# Patient Record
Sex: FEMALE | Race: WHITE | Employment: FULL TIME | URBAN - METROPOLITAN AREA
[De-identification: names, ages, dates, MRNs, and addresses within clinical notes are randomized per-mention and may not be internally consistent; named-entity substitution may affect disease eponyms.]

---

## 2017-10-04 ENCOUNTER — HOSPITAL ENCOUNTER (OUTPATIENT)
Dept: MRI IMAGING | Facility: CLINIC | Age: 47
Discharge: HOME | End: 2017-10-04
Attending: FAMILY MEDICINE | Admitting: FAMILY MEDICINE
Payer: COMMERCIAL

## 2017-10-04 DIAGNOSIS — N63.10 LUMP OF RIGHT BREAST: ICD-10-CM

## 2017-10-04 DIAGNOSIS — Z00.00 ROUTINE GENERAL MEDICAL EXAMINATION AT A HEALTH CARE FACILITY: ICD-10-CM

## 2017-10-04 PROCEDURE — 25000128 H RX IP 250 OP 636: Performed by: RADIOLOGY

## 2017-10-04 PROCEDURE — A9585 GADOBUTROL INJECTION: HCPCS | Performed by: RADIOLOGY

## 2017-10-04 PROCEDURE — 0159T MR BREAST BILATERAL W/O & W CONTRAST: CPT

## 2017-10-04 RX ORDER — GADOBUTROL 604.72 MG/ML
7.5 INJECTION INTRAVENOUS ONCE
Status: COMPLETED | OUTPATIENT
Start: 2017-10-04 | End: 2017-10-04

## 2017-10-04 RX ADMIN — GADOBUTROL 7.5 ML: 604.72 INJECTION INTRAVENOUS at 11:46

## 2018-04-24 ENCOUNTER — THERAPY VISIT (OUTPATIENT)
Dept: OCCUPATIONAL THERAPY | Facility: CLINIC | Age: 48
End: 2018-04-24
Payer: COMMERCIAL

## 2018-04-24 DIAGNOSIS — M25.521 RIGHT ELBOW PAIN: Primary | ICD-10-CM

## 2018-04-24 PROCEDURE — 97110 THERAPEUTIC EXERCISES: CPT | Mod: GO | Performed by: OCCUPATIONAL THERAPIST

## 2018-04-24 PROCEDURE — 97165 OT EVAL LOW COMPLEX 30 MIN: CPT | Mod: GO | Performed by: OCCUPATIONAL THERAPIST

## 2018-04-24 NOTE — LETTER
[facility]  [address] [address]  [phone]    2018    Re: Lucero Cedeno   :   1970  MRN:  [id number]   REFERRING PHYSICIAN:   Pavithra Knutson    [facility]  Date of Initial Evaluation:  2018  Visits: 1  Rxs Used: 1  Reason for Referral:  Right elbow pain  EVALUATION SUMMARY  Hand Therapy Initial Evaluation  Current Date:  2018  Referring MD: Pavithra Knutson  Return to MD: As needed  Diagnosis:  R Elbow Pain (Medial Epicondylitis)  DOI: 2018  Post:  2 months  Subjective:  Lucero Cedeno is a 47 year old right hand dominant female.  Patient reports symptoms of pain and weakness/loss of strength of the right medial elbow which occurred due to unknown. Patient reports she does a lot of patient transfers and computer work that may have aggravated. Since onset symptoms are Gradually getting better since CSI.  Special tests:  none.  Previous treatment: CSI 18, tennis elbow brace, ice, lidocaine patches (mild help), wrist brace (not helpful), K-tape (mild help). General health as reported by patient is good.  Pertinent medical history includes:migraines/headaches  Medical allergies:Thimerosol.  Surgical history: orthopedic: R Knee Arthroscopy, other: endometrium extraction and umbilical hernia repair.  Medication history: see EMR.  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 - about 24 hours/week but this varies  Job Tasks: prolonged sitting, lifting/carrying, pushing/pulling, computer work  Prior functional level:  no limitations  Barriers include:none  Mobility: No difficulty  Transportation: drives  Leisure activities/hobbies: Workouts - does classes or independent cardio (bike and cardio), walking/running  Other: Has 9 year old, lives with   Upper Extremity Functional Index Score:  SCORE:   Column Totals: /80: 63   (A lower score  indicates greater disability.)  Objective:  Pain Report:  VAS(0-10) 4/24/18   At Rest: 0/10 post CSI  4-5/10 pre CSI   With Use: 2-3/10 post CSI  6-7/10 Post CSI   Location:  R Medial Elbow/Medial Epicondyle  Description:  Tender, sharp with use, constant sore feeling  Frequency:  Intermittent now   Pain is worse: During the day  Pain is exacerbated by:  Patient transfers/lifting, moving groceries/laundry, computer use, wringing out washcloth  Pain is relieved by:  CSI, tendonitis strap, ice, ibuprofen, hydration  Progression since onset:  Resolving since CSI  ROM: Elbow AROM (PROM)  4/24/18 Date: 4/24/18   Left Side: Right   5 Extension 5   140 Flexion 140   90 Supination 90   80 Pronation 80   ROM: Wrist AROM (PROM)  4/24 Date: 4/24   Left Side: Right   75 Extension 67   80 Flexion 75   25 RD 25   40 UD 37    and Pinch Strength - Deferred due to recent CSI  Edema:  [x]        None  []         Mild   []        Moderate  []         Severe     []         of affected part  Scar/Wound:  None  Sensation: [x]         WNL throughout all nerve distributions; per patient report    []         Decreased  Median  Ulnar  Radial  nerve distribution    Resisted Testing: Deferred due to recent CSI  Palpation  Date 4/24/18   Side Right   MEP NT due to injection   Cubital Tunnel + mild   Flexor Wad neg           Special Tests:   Tinels over Cubital Tunnel: neg  Assessment:  Patient presents with symptoms consistent with diagnosis as listed above with conservative intervention.  Patient's limitations or Problem List includes:  Pain, Weakness, Decreased , Tightness in musculature and Adherence in connective tissue of the right elbow which interferes with the patient's ability to perform Self Care Tasks (dressing, eating, bathing, hygiene/toileting), Work Tasks, Recreational Activities and Household Chores as compared to previous level of function.  Rehab Potential:  Excellent - Return to full activity, no limitations  Re:  Lucero Cedeno   :   1970    Patient will benefit from skilled Occupational Therapy to increase  strength and coordination and decrease pain to return to previous activity level and resume normal daily tasks and to reach their rehab potential.  Barriers to Learning:  No barrier  Communication Issues:  Patient appears to be able to clearly communicate and understand verbal and written communication and follow directions correctly.  Chart Review: Detailed history review with patient  Identified Performance Deficits: dressing, hygiene and grooming, health management and maintenance, home establishment and management, work and leisure activities    Assessment of Occupational Performance:  3-5 Performance Deficits  Clinical Decision Making (Complexity): Low complexity  Treatment Explanation:  The following has been discussed with the patient:  RX ordered/plan of care  Anticipated outcomes  Possible risks and side effects  Plan:  Frequency:  1 X every other week, once daily for 2 weeks increasing to 1 x week, once daily   Duration:  for 8 visits    Treatment Plan:  Modalities:  US and Laser Light  Therapeutic Exercise:  AROM, AAROM, PROM, Isotonics and Isometrics  Neuromuscular re-education:  Nerve Gliding and Kinesiotaping  Manual Techniques:  Joint mobilization, Friction massage and Myofascial release  Orthotic Fabrication:  Forearm based orthosis  Self Care:  Self Care Tasks  Home Program:  Avoid underhand lifting, excessive gripping, activities that aggravate pain  OTC Wrist cock up splint for work and tasks that involve lifting/gripping  Tennis elbow strap (over flexors) as prescribed by MD prn  Self massage to FA flexors with ball (avoid tendon until healed from CSI)  Gentle, pain free prolonged FA flexor and extensor stretches (with elbow bent at side)  Next visit:   Patient will follow up in 2 weeks after elbow has recovered from CSI  Laser/US?  FM/MFR - add FM to HEP  FA stretches  Progress to  strengthening when pain decreases  Future visits: strength testing deferred due to recent CSI for initial eval, obtain measures at future visit  Discharge Plan:  Achieve all LTG.  Independent in home treatment program.  Reach maximal therapeutic benefit.                Re: Lucero Fergusonil   :   1970    Thank you for your referral.    INQUIRIES  Therapist: Faby Tracy MS, OTR/L, CHT   [address] [address]  Phone: [phone]  Fax: [phone]

## 2018-04-24 NOTE — PROGRESS NOTES
Hand Therapy Initial Evaluation  Current Date:  4/24/2018    Referring MD: Pavithra Knutson  Return to MD: As needed    Diagnosis:  R Elbow Pain (Medial Epicondylitis)  DOI: March 2018  Post:  2 months    Subjective:  Lucero Cedeno is a 47 year old right hand dominant female.    Patient reports symptoms of pain and weakness/loss of strength of the right medial elbow which occurred due to unknown. Patient reports she does a lot of patient transfers and computer work that may have aggravated. Since onset symptoms are Gradually getting better since CSI.  Special tests:  none.  Previous treatment: CSI 4/19/18, tennis elbow brace, ice, lidocaine patches (mild help), wrist brace (not helpful), K-tape (mild help). General health as reported by patient is good.  Pertinent medical history includes:migraines/headaches  Medical allergies:Thimerosol.  Surgical history: orthopedic: R Knee Arthroscopy, other: endometrium extraction and umbilical hernia repair.  Medication history: see EMR.    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 - about 24 hours/week but this varies  Job Tasks: prolonged sitting, lifting/carrying, pushing/pulling, computer work  Prior functional level:  no limitations  Barriers include:none  Mobility: No difficulty  Transportation: drives  Leisure activities/hobbies: Workouts - does classes or independent cardio (bike and cardio), walking/running  Other: Has 9 year old, lives with     Upper Extremity Functional Index Score:  SCORE:   Column Totals: /80: 63   (A lower score indicates greater disability.)    Objective:    Pain Report:  VAS(0-10) 4/24/18   At Rest: 0/10 post CSI  4-5/10 pre CSI   With Use: 2-3/10 post CSI  6-7/10 Post CSI   Location:  R Medial Elbow/Medial Epicondyle  Description:  Tender, sharp with use, constant sore feeling  Frequency:  Intermittent now   Pain is worse: During the day  Pain is  exacerbated by:  Patient transfers/lifting, moving groceries/laundry, computer use, wringing out washcloth  Pain is relieved by:  CSI, tendonitis strap, ice, ibuprofen, hydration  Progression since onset:  Resolving since CSI    ROM: Elbow AROM (PROM)  4/24/18 Date: 4/24/18   Left Side: Right   5 Extension 5   140 Flexion 140   90 Supination 90   80 Pronation 80     ROM: Wrist AROM (PROM)  4/24 Date: 4/24   Left Side: Right   75 Extension 67   80 Flexion 75   25 RD 25   40 UD 37      and Pinch Strength - Deferred due to recent CSI    Edema:  [x]        None  []         Mild   []        Moderate  []         Severe     []         of affected part    Scar/Wound:  None    Sensation: [x]         WNL throughout all nerve distributions; per patient report    []         Decreased  Median  Ulnar  Radial  nerve distribution    Resisted Testing: Deferred due to recent CSI    Palpation  Date 4/24/18   Side Right   MEP NT due to injection   Cubital Tunnel + mild   Flexor Wad neg             Special Tests:   Tinels over Cubital Tunnel: neg    Assessment:  Patient presents with symptoms consistent with diagnosis as listed above with conservative intervention.    Patient's limitations or Problem List includes:  Pain, Weakness, Decreased , Tightness in musculature and Adherence in connective tissue of the right elbow which interferes with the patient's ability to perform Self Care Tasks (dressing, eating, bathing, hygiene/toileting), Work Tasks, Recreational Activities and Household Chores as compared to previous level of function.    Rehab Potential:  Excellent - Return to full activity, no limitations    Patient will benefit from skilled Occupational Therapy to increase  strength and coordination and decrease pain to return to previous activity level and resume normal daily tasks and to reach their rehab potential.    Barriers to Learning:  No barrier    Communication Issues:  Patient appears to be able to clearly  communicate and understand verbal and written communication and follow directions correctly.    Chart Review: Detailed history review with patient    Identified Performance Deficits: dressing, hygiene and grooming, health management and maintenance, home establishment and management, work and leisure activities    Assessment of Occupational Performance:  3-5 Performance Deficits    Clinical Decision Making (Complexity): Low complexity    Treatment Explanation:  The following has been discussed with the patient:  RX ordered/plan of care  Anticipated outcomes  Possible risks and side effects    Plan:  Frequency:  1 X every other week, once daily for 2 weeks increasing to 1 x week, once daily   Duration:  for 8 visits    Treatment Plan:  Modalities:  US and Laser Light  Therapeutic Exercise:  AROM, AAROM, PROM, Isotonics and Isometrics  Neuromuscular re-education:  Nerve Gliding and Kinesiotaping  Manual Techniques:  Joint mobilization, Friction massage and Myofascial release  Orthotic Fabrication:  Forearm based orthosis  Self Care:  Self Care Tasks    Home Program:  Avoid underhand lifting, excessive gripping, activities that aggravate pain  OTC Wrist cock up splint for work and tasks that involve lifting/gripping  Tennis elbow strap (over flexors) as prescribed by MD prn  Self massage to FA flexors with ball (avoid tendon until healed from CSI)  Gentle, pain free prolonged FA flexor and extensor stretches (with elbow bent at side)    Next visit:   Patient will follow up in 2 weeks after elbow has recovered from CSI  Laser/US?  FM/MFR - add FM to HEP  FA stretches  Progress to strengthening when pain decreases  Future visits: strength testing deferred due to recent CSI for initial eval, obtain measures at future visit    Discharge Plan:  Achieve all LTG.  Independent in home treatment program.  Reach maximal therapeutic benefit.    Please see daily flow sheet for treatment and 1:1 time provided today.

## 2018-05-11 ENCOUNTER — THERAPY VISIT (OUTPATIENT)
Dept: OCCUPATIONAL THERAPY | Facility: CLINIC | Age: 48
End: 2018-05-11
Payer: COMMERCIAL

## 2018-05-11 DIAGNOSIS — M25.521 RIGHT ELBOW PAIN: ICD-10-CM

## 2018-05-11 PROCEDURE — 97140 MANUAL THERAPY 1/> REGIONS: CPT | Mod: GO | Performed by: OCCUPATIONAL THERAPIST

## 2018-05-11 PROCEDURE — 97110 THERAPEUTIC EXERCISES: CPT | Mod: GO | Performed by: OCCUPATIONAL THERAPIST

## 2018-05-11 PROCEDURE — 97035 APP MDLTY 1+ULTRASOUND EA 15: CPT | Mod: GO | Performed by: OCCUPATIONAL THERAPIST

## 2018-05-11 NOTE — PROGRESS NOTES
SOAP note objective information for 5/11/2018.    Please refer to the daily flowsheet for treatment today, total treatment time and time spent performing 1:1 timed codes.       Objective:    Pain Report:  VAS(0-10) 4/24/18 5/11/2018   At Rest: 0/10 post CSI  4-5/10 pre CSI R: 0/10   With Use: 2-3/10 post CSI  6-7/10 Post CSI R: 1-2     Location:  R Medial Elbow/Medial Epicondyle  Description:  Tender, sharp with use, constant sore feeling  Frequency:  Intermittent now   Pain is worse: During the day  Pain is exacerbated by:  Patient transfers/lifting, moving groceries/laundry, computer use, wringing out washcloth  Pain is relieved by:  CSI, tendonitis strap, ice, ibuprofen, hydration  Progression since onset:  Resolving since CSI    ROM: Elbow AROM (PROM)  4/24/18 Date: 4/24/18   Left Side: Right   5 Extension 5   140 Flexion 140   90 Supination 90   80 Pronation 80     ROM: Wrist AROM (PROM)  4/24 Date: 4/24   Left Side: Right   75 Extension 67   80 Flexion 75   25 RD 25   40 UD 37      and Pinch Strength - Deferred due to recent CSI    Edema:  [x]        None  []         Mild   []        Moderate  []         Severe     []         of affected part    Scar/Wound:  None    Sensation: [x]         WNL throughout all nerve distributions; per patient report    []         Decreased  Median  Ulnar  Radial  nerve distribution    Resisted Testing: Deferred due to recent CSI    Palpation  Date 4/24/18 5/11/2018   Side Right Right   MEP NT due to injection Minimal   Cubital Tunnel + mild -   Flexor Wad neg ++               Special Tests:   Tinels over Cubital Tunnel: neg    Home Program:  Avoid underhand lifting, excessive gripping, activities that aggravate pain  OTC Wrist cock up splint for work and tasks that involve lifting/gripping  Tennis elbow strap (over flexors) as prescribed by MD prn  Self massage to FA flexors with ball   Gentle, pain free prolonged FA flexor and extensor stretches  FM to MEP    Next visit:    US  FM/MFR - add FM to HEP  FA stretches  Progress to strengthening when pain decreases  Future visits: strength testing

## 2018-05-11 NOTE — MR AVS SNAPSHOT
"              After Visit Summary   5/11/2018    Lucero Cedeno    MRN: [id number]           Patient Information     Date Of Birth          1970        Visit Information        Provider Department      5/11/2018 11:00 AM Danette Kunz [facility]        Today's Diagnoses     Right elbow pain           Follow-ups after your visit        Your next 10 appointments already scheduled     May 17, 2018 11:00 AM CDT   [facility] Hand with Danette Ze   [facility]    [address] [address] [phone]            May 24, 2018  1:00 PM CDT   CK Hand with Claudia Haney, OT   [facility] ([facility])    [address] [address] [phone]            May 31, 2018 11:00 AM CDT   CK Hand with Danette Kunz   [facility]    [address] [id number] [phone]              Who to contact     If you have questions or need follow up information about today's clinic visit or your schedule please contact [facility] directly at [phone].  Normal or non-critical lab and imaging results will be communicated to you by [employer], letter or phone within 4 business days after the clinic has received the results. If you do not hear from us within 7 days, please contact the clinic through [employer] or phone. If you have a critical or abnormal lab result, we will notify you by phone as soon as possible.  Submit refill requests through [employer] or call your pharmacy and they will forward the refill request to us. Please allow 3 business days for your refill to be completed.          Additional Information About Your Visit        [employer] Information     [employer] lets you send messages to your doctor, view your test results, renew your prescriptions, schedule appointments and more. To sign up, go to [email or web address] . Click on \"Log in\" on the left " "side of the screen, which will take you to the Welcome page. Then click on \"Sign up Now\" on the right side of the page.     You will be asked to enter the access code listed below, as well as some personal information. Please follow the directions to create your username and password.     Your access code is: [email or web address]  Expires: 2018 11:55 AM     Your access code will  in 90 days. If you need help or a new code, please call your [facility] or [phone].        Care EveryWhere ID     This is your Care EveryWhere ID. This could be used by other organizations to access your [place] medical records  [id number]         Blood Pressure from Last 3 Encounters:   11 115/75    Weight from Last 3 Encounters:   11 71.7 kg (158 lb)              We Performed the Following     MANUAL THER TECH,1+REGIONS,EA 15 MIN     THERAPEUTIC EXERCISES     ULTRASOUND THERAPY        Primary Care Provider Office Phone # Fax #    Snehal Solitario [phone]       [address] [place] [address] [place] [address]        Equal Access to Services     TEJAS MUHAMMAD : Hadii aad ku hadasho Soomaali, waaxda luqadaha, qaybta kaalmada adejeanyaandres, alfredo montez . So [facility] [phone].    ATENCIÓN: Si habla español, tiene a ramesh disposición servicios gratuitos de asistencia lingüística. [facility] [phone].    We comply with applicable federal civil rights laws and [place] laws. We do not discriminate on the basis of race, color, national origin, age, disability, sex, sexual orientation, or gender identity.            Thank you!     Thank you for choosing [facility]  for your care. Our goal is always to provide you with excellent care. Hearing back from our patients is one way we can continue to improve our services. Please take a few minutes to complete the written survey that you may receive in the mail after your visit with us. Thank you!             Your " Updated Medication List - Protect others around you: Learn how to safely use, store and throw away your medicines at www.disposemymeds.org.          This list is accurate as of 5/11/18 12:03 PM.  Always use your most recent med list.                   Brand Name Dispense Instructions for use Diagnosis    CALCIUM + D PO      Take  by mouth.        FISH OIL PO      Take  by mouth.        MULTIVITAMIN PO      Take  by mouth.        spironolactone 25 MG tablet    ALDACTONE     Take 25 mg by mouth daily.        venlafaxine 50 MG tablet    EFFEXOR     Take 50 mg by mouth 3 times daily.        vitamin D 400 units capsule      Take 1 capsule by mouth daily.

## 2018-05-17 ENCOUNTER — THERAPY VISIT (OUTPATIENT)
Dept: OCCUPATIONAL THERAPY | Facility: CLINIC | Age: 48
End: 2018-05-17
Payer: COMMERCIAL

## 2018-05-17 DIAGNOSIS — M25.521 RIGHT ELBOW PAIN: ICD-10-CM

## 2018-05-17 PROCEDURE — 97140 MANUAL THERAPY 1/> REGIONS: CPT | Mod: GO | Performed by: OCCUPATIONAL THERAPIST

## 2018-05-17 PROCEDURE — 97110 THERAPEUTIC EXERCISES: CPT | Mod: GO | Performed by: OCCUPATIONAL THERAPIST

## 2018-05-17 PROCEDURE — 97035 APP MDLTY 1+ULTRASOUND EA 15: CPT | Mod: GO | Performed by: OCCUPATIONAL THERAPIST

## 2018-05-17 NOTE — PROGRESS NOTES
SOAP note objective information for 5/17/2018.    Please refer to the daily flowsheet for treatment today, total treatment time and time spent performing 1:1 timed codes.       Objective:    Pain Report:  VAS(0-10) 4/24/18 5/11/18 5/17/18   At Rest: 0/10 post CSI  4-5/10 pre CSI R: 0/10 R: 0/10   With Use: 2-3/10 post CSI  6-7/10 Post CSI R: 1-2 R: 1-2     Location:  R Medial Elbow/Medial Epicondyle  Description:  Tender, sharp with use, constant sore feeling  Frequency:  Intermittent now   Pain is worse: During the day  Pain is exacerbated by:  Patient transfers/lifting, moving groceries/laundry, computer use, wringing out washcloth  Pain is relieved by:  CSI, tendonitis strap, ice, ibuprofen, hydration  Progression since onset:  Resolving since CSI    ROM: Elbow AROM (PROM)  4/24/18 Date: 4/24/18   Left Side: Right   5 Extension 5   140 Flexion 140   90 Supination 90   80 Pronation 80     ROM: Wrist AROM (PROM)  4/24 Date: 4/24 5/18   Left Side: Right Right   75 Extension 67 75   80 Flexion 75 80   25 RD 25    40 UD 37       and Pinch Strength - Deferred due to recent CSI    Edema:  [x]        None  []         Mild   []        Moderate  []         Severe     []         of affected part    Scar/Wound:  None    Sensation: [x]         WNL throughout all nerve distributions; per patient report    []         Decreased  Median  Ulnar  Radial  nerve distribution    Resisted Testing: Deferred due to recent CSI    Palpation  Date 4/24/18 5/11/18 5/17/18   Side Right Right Right   MEP NT due to injection Minimal    Cubital Tunnel + mild -    Flexor Wad neg ++ +                 Resisted Testing 5/17/2018       FCU -       Pronator -       Finger flexors -                             Special Tests:   Tinels over Cubital Tunnel: neg    Home Program:  Avoid underhand lifting, excessive gripping, activities that aggravate pain  OTC Wrist cock up splint for work and tasks that involve lifting/gripping  Tennis elbow strap  (over flexors) as prescribed by MD prn  Self massage to FA flexors with ball   Gentle, pain free prolonged FA flexor and extensor stretches  FM to MEP  Eccentric wrist strengthening    Next visit:   US  [facility]  Future visits: strength testing

## 2018-05-17 NOTE — MR AVS SNAPSHOT
"              After Visit Summary   5/17/2018    Lucero Cedeno    MRN: [id number]           Patient Information     Date Of Birth          1970        Visit Information        Provider Department      5/17/2018 11:00 AM Danette Kunz [facility]        Today's Diagnoses     Right elbow pain           Follow-ups after your visit        Your next 10 appointments already scheduled     May 24, 2018  1:00 PM CDT   CK Hand with Claudia Haney OT   [facility] ([facility])    [address] [place] [address] [phone]              Who to contact     If you have questions or need follow up information about today's clinic visit or your schedule please contact [facility] directly at [phone].  Normal or non-critical lab and imaging results will be communicated to you by MyChart, letter or phone within 4 business days after the clinic has received the results. If you do not hear from us within 7 days, please contact the clinic through MyChart or phone. If you have a critical or abnormal lab result, we will notify you by phone as soon as possible.  Submit refill requests through ExpertFlyer or call your pharmacy and they will forward the refill request to us. Please allow 3 business days for your refill to be completed.          Additional Information About Your Visit        DealCurioushart Information     ExpertFlyer lets you send messages to your doctor, view your test results, renew your prescriptions, schedule appointments and more. To sign up, go to [email or web address] . Click on \"Log in\" on the left side of the screen, which will take you to the Welcome page. Then click on \"Sign up Now\" on the right side of the page.     You will be asked to enter the access code listed below, as well as some personal information. Please follow the directions to create your username and password.     Your access code is: [email or web address]  Expires: 8/8/2018 11:55 AM     Your access " code will  in 90 days. If you need help or a new code, please call your [place] clinic or [phone].        Care EveryWhere ID     This is your Care EveryWhere ID. This could be used by other organizations to access your [place] medical records  [id number]         Blood Pressure from Last 3 Encounters:   11 115/75    Weight from Last 3 Encounters:   11 71.7 kg (158 lb)              We Performed the Following     MANUAL THER TECH,1+REGIONS,EA 15 MIN     THERAPEUTIC EXERCISES     ULTRASOUND THERAPY        Primary Care Provider Office Phone # Fax #    Snehal Solitario [phone]       [address] [place] [place] [address]        Equal Access to Services     TEJAS MUHAMMAD : Hadii aad ku hadasho Soomaali, waaxda luqadaha, qaybta kaalmada adeegyada, waxay idiin hayalessandron madan montez . So [facility] [phone].    ATENCIÓN: Si habla español, tiene a ramesh disposición servicios gratuitos de asistencia lingüística. [facility] [phone].    We comply with applicable federal civil rights laws and [place] laws. We do not discriminate on the basis of race, color, national origin, age, disability, sex, sexual orientation, or gender identity.            Thank you!     Thank you for choosing [facility]  for your care. Our goal is always to provide you with excellent care. Hearing back from our patients is one way we can continue to improve our services. Please take a few minutes to complete the written survey that you may receive in the mail after your visit with us. Thank you!             Your Updated Medication List - Protect others around you: Learn how to safely use, store and throw away your medicines at www.disposemymeds.org.          This list is accurate as of 18 11:50 AM.  Always use your most recent med list.                   Brand Name Dispense Instructions for use Diagnosis    CALCIUM + D PO      Take  by mouth.        FISH OIL PO       Take  by mouth.        MULTIVITAMIN PO      Take  by mouth.        spironolactone 25 MG tablet    ALDACTONE     Take 25 mg by mouth daily.        venlafaxine 50 MG tablet    EFFEXOR     Take 50 mg by mouth 3 times daily.        vitamin D 400 units capsule      Take 1 capsule by mouth daily.

## 2018-05-24 ENCOUNTER — THERAPY VISIT (OUTPATIENT)
Dept: OCCUPATIONAL THERAPY | Facility: CLINIC | Age: 48
End: 2018-05-24
Payer: COMMERCIAL

## 2018-05-24 DIAGNOSIS — M25.521 RIGHT ELBOW PAIN: ICD-10-CM

## 2018-05-24 PROCEDURE — 97140 MANUAL THERAPY 1/> REGIONS: CPT | Mod: GO | Performed by: OCCUPATIONAL THERAPIST

## 2018-05-24 PROCEDURE — 97035 APP MDLTY 1+ULTRASOUND EA 15: CPT | Mod: GO | Performed by: OCCUPATIONAL THERAPIST

## 2018-05-24 PROCEDURE — 97110 THERAPEUTIC EXERCISES: CPT | Mod: GO | Performed by: OCCUPATIONAL THERAPIST

## 2018-05-24 NOTE — PROGRESS NOTES
"SOAP note objective information for 5/24/2018.    Please refer to the daily flowsheet for treatment today, total treatment time and time spent performing 1:1 timed codes.     S; \"the only thing I have noticed was cutting a block of cheese I noticed just a little twinge\"  Objective:    Pain Report:  VAS(0-10) 4/24/18 5/11/18 5/17/18 5/24   At Rest: 0/10 post CSI  4-5/10 pre CSI R: 0/10 R: 0/10 R:0/10   With Use: 2-3/10 post CSI  6-7/10 Post CSI R: 1-2 R: 1-2 R:0-1     Location:  R Medial Elbow/Medial Epicondyle  Description:  Twinge  Frequency:  infrequent   Pain is worse: During the day  Pain is exacerbated by:  Patient trans  fers/lifting, moving groceries/laundry, computer use, wringing out washcloth  Pain is relieved by:  CSI, tendonitis strap, ice, ibuprofen, hydration  Progression since onset:  Resolving since CSI    ROM: Elbow AROM (PROM)  4/24/18 Date: 4/24/18   Left Side: Right   5 Extension 5   140 Flexion 140   90 Supination 90   80 Pronation 80     ROM: Wrist AROM (PROM)  4/24 Date: 4/24 5/18   Left Side: Right Right   75 Extension 67 75   80 Flexion 75 80   25 RD 25    40 UD 37       and Pinch Strength - Deferred due to recent CSI    Edema:  [x]        None  []         Mild   []        Moderate  []         Severe     []         of affected part    Scar/Wound:  None    Sensation: [x]         WNL throughout all nerve distributions; per patient report    []         Decreased  Median  Ulnar  Radial  nerve distribution    Resisted Testing: Deferred due to recent CSI    Palpation  Date 4/24/18 5/11/18 5/17/18 5/24   Side Right Right Right    MEP NT due to injection Minimal     Cubital Tunnel + mild -     Flexor Wad neg ++ +                    Resisted Testing 5/17/2018       FCU -       Pronator -       Finger flexors -                             Special Tests:   Tinels over Cubital Tunnel: neg    Home Program:  Avoid underhand lifting, excessive gripping, activities that aggravate pain  OTC Wrist cock up " splint for work and tasks that involve lifting/gripping  Tennis elbow strap (over flexors) as prescribed by MD prn  Self massage to FA flexors with ball   Gentle, pain free prolonged FA flexor and extensor stretches  FM to MEP  Eccentric wrist strengthening  Can wean into more functional tasks being mindful of pain    Next visit:   [facility]  Future visits: strength testing

## 2018-05-24 NOTE — MR AVS SNAPSHOT
"              After Visit Summary   2018    Lucero Cedeno    MRN: [id number]           Patient Information     Date Of Birth          1970        Visit Information        Provider Department      2018 1:00 PM Claudia Haney OT [facility]        Today's Diagnoses     Right elbow pain           Follow-ups after your visit        Who to contact     If you have questions or need follow up information about today's clinic visit or your schedule please contact [facility] directly at [phone].  Normal or non-critical lab and imaging results will be communicated to you by MyChart, letter or phone within 4 business days after the clinic has received the results. If you do not hear from us within 7 days, please contact the clinic through iHearthart or phone. If you have a critical or abnormal lab result, we will notify you by phone as soon as possible.  Submit refill requests through [employer] or call your pharmacy and they will forward the refill request to us. Please allow 3 business days for your refill to be completed.          Additional Information About Your Visit        MyChart Information     [employer] lets you send messages to your doctor, view your test results, renew your prescriptions, schedule appointments and more. To sign up, go to [email or web address] . Click on \"Log in\" on the left side of the screen, which will take you to the Welcome page. Then click on \"Sign up Now\" on the right side of the page.     You will be asked to enter the access code listed below, as well as some personal information. Please follow the directions to create your username and password.     Your access code is: [email or web address]  Expires: 2018 11:55 AM     Your access code will  in 90 days. If you need help or a new code, please call your [place] clinic or [phone].        Care EveryWhere ID     This is your Care EveryWhere ID. This could be used by other organizations to access your " [place] medical records  [id number]         Blood Pressure from Last 3 Encounters:   06/21/11 115/75    Weight from Last 3 Encounters:   06/21/11 71.7 kg (158 lb)              We Performed the Following     MANUAL THER TECH,1+REGIONS,EA 15 MIN     THERAPEUTIC EXERCISES     ULTRASOUND THERAPY        Primary Care Provider Office Phone # Fax #    Snehal Solitario [phone]       [address] [place] [address] [place] [address]        Equal Access to Services     TEJAS MUHAMMAD : Hadii aad ku hadasho Soomaali, waaxda luqadaha, qaybta kaalmada adeegyada, waxay idiin hayaan adeeg kharash la'bertrand . So [facility] [phone].    ATENCIÓN: Si habla español, tiene a ramesh disposición servicios gratuitos de asistencia lingüística. [facility] [phone].    We comply with applicable federal civil rights laws and [place] laws. We do not discriminate on the basis of race, color, national origin, age, disability, sex, sexual orientation, or gender identity.            Thank you!     Thank you for choosing [facility]  for your care. Our goal is always to provide you with excellent care. Hearing back from our patients is one way we can continue to improve our services. Please take a few minutes to complete the written survey that you may receive in the mail after your visit with us. Thank you!             Your Updated Medication List - Protect others around you: Learn how to safely use, store and throw away your medicines at www.disposemymeds.org.          This list is accurate as of 5/24/18  1:59 PM.  Always use your most recent med list.                   Brand Name Dispense Instructions for use Diagnosis    CALCIUM + D PO      Take  by mouth.        FISH OIL PO      Take  by mouth.        MULTIVITAMIN PO      Take  by mouth.        spironolactone 25 MG tablet    ALDACTONE     Take 25 mg by mouth daily.        venlafaxine 50 MG tablet    EFFEXOR     Take 50 mg by mouth 3 times daily.         vitamin D 400 units capsule      Take 1 capsule by mouth daily.

## 2018-06-28 ENCOUNTER — THERAPY VISIT (OUTPATIENT)
Dept: OCCUPATIONAL THERAPY | Facility: CLINIC | Age: 48
End: 2018-06-28
Payer: COMMERCIAL

## 2018-06-28 DIAGNOSIS — M25.521 RIGHT ELBOW PAIN: ICD-10-CM

## 2018-06-28 PROCEDURE — 97110 THERAPEUTIC EXERCISES: CPT | Mod: GO | Performed by: OCCUPATIONAL THERAPIST

## 2018-06-28 PROCEDURE — 97035 APP MDLTY 1+ULTRASOUND EA 15: CPT | Mod: GO | Performed by: OCCUPATIONAL THERAPIST

## 2018-06-28 PROCEDURE — 97140 MANUAL THERAPY 1/> REGIONS: CPT | Mod: GO | Performed by: OCCUPATIONAL THERAPIST

## 2018-06-28 NOTE — MR AVS SNAPSHOT
"              After Visit Summary   6/28/2018    Lucero Cedeno    MRN: [id number]           Patient Information     Date Of Birth          1970        Visit Information        Provider Department      6/28/2018 8:00 AM Danette Kunz [facility]        Today's Diagnoses     Right elbow pain           Follow-ups after your visit        Your next 10 appointments already scheduled     Jul 02, 2018  9:00 AM CDT   CK Hand with Dayanna Singletary OT   [facility] ([facility])    [address] [facility] [facility]  [address] [facility] [id number] [phone]            Jul 10, 2018 10:00 AM CDT   CK Hand with Danette Ze   [facility] ([facility]    [address] [place] [address] [phone]              Who to contact     If you have questions or need follow up information about today's clinic visit or your schedule please contact [facility] directly at [phone].  Normal or non-critical lab and imaging results will be communicated to you by Authentic8hart, letter or phone within 4 business days after the clinic has received the results. If you do not hear from us within 7 days, please contact the clinic through [employer] Education[employer] or phone. If you have a critical or abnormal lab result, we will notify you by phone as soon as possible.  Submit refill requests through LensAR or call your pharmacy and they will forward the refill request to us. Please allow 3 business days for your refill to be completed.          Additional Information About Your Visit        Authentic8harBering Media Information     LensAR lets you send messages to your doctor, view your test results, renew your prescriptions, schedule appointments and more. To sign up, go to [email or web address] . Click on \"Log in\" on the left side of the screen, which will take you to the Welcome page. Then click on \"Sign up Now\" on the right side of the page.     You will be asked to enter the " access code listed below, as well as some personal information. Please follow the directions to create your username and password.     Your access code is: [email or web address]  Expires: 2018 11:55 AM     Your access code will  in 90 days. If you need help or a new code, please call your [facility] or [phone].        Care EveryWhere ID     This is your Care EveryWhere ID. This could be used by other organizations to access your [place] medical records  [id number]         Blood Pressure from Last 3 Encounters:   11 115/75    Weight from Last 3 Encounters:   11 71.7 kg (158 lb)              We Performed the Following     CK PROGRESS NOTES REPORT     MANUAL THER TECH,1+REGIONS,EA 15 MIN     THERAPEUTIC EXERCISES     ULTRASOUND THERAPY        Primary Care Provider Office Phone # Fax #    Snehal Solitario [phone]       [address] [place] [place] [address]        Equal Access to Services     TEJAS MUHAMMAD : Hadii aad ku hadasho Soomaali, waaxda luqadaha, qaybta kaalmada adeegyada, waxay idiin hayaan madan montez . So [facility] [phone].    ATENCIÓN: Si habla español, tiene a ramesh disposición servicios gratuitos de asistencia lingüística. Llame al [phone].    We comply with applicable federal civil rights laws and [place] laws. We do not discriminate on the basis of race, color, national origin, age, disability, sex, sexual orientation, or gender identity.            Thank you!     Thank you for choosing [facility]  for your care. Our goal is always to provide you with excellent care. Hearing back from our patients is one way we can continue to improve our services. Please take a few minutes to complete the written survey that you may receive in the mail after your visit with us. Thank you!             Your Updated Medication List - Protect others around you: Learn how to safely use, store and throw away your medicines at  www.disposemymeds.org.          This list is accurate as of 6/28/18 11:56 AM.  Always use your most recent med list.                   Brand Name Dispense Instructions for use Diagnosis    CALCIUM + D PO      Take  by mouth.        FISH OIL PO      Take  by mouth.        MULTIVITAMIN PO      Take  by mouth.        spironolactone 25 MG tablet    ALDACTONE     Take 25 mg by mouth daily.        venlafaxine 50 MG tablet    EFFEXOR     Take 50 mg by mouth 3 times daily.        vitamin D 400 units capsule      Take 1 capsule by mouth daily.

## 2018-06-28 NOTE — PROGRESS NOTES
Progress Note - Hand Therapy    Current Date:  6/28/2018    Diagnosis:  R Elbow Pain (Medial Epicondylitis)  DOI: March 2018  Post:  3 months    Number of visits to date:  4    Reporting period is 4/24/2018 to 6/28/2018.    Subjectve:   Subjective changes as noted by patient:  Overdid using arm in the garden.  It really hurts now.   Functional changes noted by patient:  Decreased Performance in Self Care Tasks (dressing, eating, bathing), Work Tasks and Household Chores  Patient has noted adverse reaction to:  None        Objective:  Pain Report:  VAS(0-10) 4/24/18 5/11/18 5/17/18 5/24 6/28/18   At Rest: 0/10 post CSI  4-5/10 pre CSI R: 0/10 R: 0/10 R:0/10 R: 4-5/10   With Use: 2-3/10 post CSI  6-7/10 Post CSI R: 1-2 R: 1-2 R:0-1 R: 6-7/10     Location:  R Medial Elbow/Medial Epicondyle  Description:  Twinge  Frequency:  infrequent   Pain is worse: During the day  Pain is exacerbated by:  Patient trans  fers/lifting, moving groceries/laundry, computer use, wringing out washcloth  Pain is relieved by:  CSI, tendonitis strap, ice, ibuprofen, hydration  Progression since onset: exacerbation    ROM: Elbow AROM (PROM)  4/24/18 Date: 4/24/18   Left Side: Right   5 Extension 5   140 Flexion 140   90 Supination 90   80 Pronation 80     ROM: Wrist AROM (PROM)  4/24 Date: 4/24 5/18   Left Side: Right Right   75 Extension 67 75   80 Flexion 75 80   25 RD 25    40 UD 37       and Pinch Strength - Deferred due to recent CSI    Edema:  [x]        None  []         Mild   []        Moderate  []         Severe     []         of affected part    Scar/Wound:  None    Sensation: [x]         WNL throughout all nerve distributions; per patient report    []         Decreased  Median  Ulnar  Radial  nerve distribution    Resisted Testing: Deferred due to recent CSI    Palpation  Date 4/24/18 5/11/18 5/17/18 6/28/18   Side Right Right Right Right   MEP NT due to injection Minimal  ++   Cubital Tunnel + mild -  -   Flexor Wad neg ++ + ++                    Resisted Testing 5/17/2018       FCU -       Pronator -       Finger flexors -                           Special Tests:   Tinels over Cubital Tunnel: neg    Assessment:  Response to therapy has been decline in:  Flexibility:  increased tightness in involved muscles  Pain:  intensity of pain has increased and duration of pain is increased    Overall Assessment:  Patient has experienced an exacerbation of symptoms.  STG/LTG:  STGoals have been reviewed and no progress has been made;  see goal sheet for details and changes.    Plan:  Frequency/Duration:  Recommend continuing to see patient  1 X week, once daily  for 4-6 visits  Appropriateness of Rx I have re-evaluated this patient and find that the nature, scope, duration and intensity of the therapy is appropriate for the medical condition of the patient.    Recommendations for Continued Therapy  Additions to Treatment Plan -  Spiky ball    Home Program:  Avoid underhand lifting, excessive gripping, activities that aggravate pain  OTC Wrist cock up splint for work and tasks that involve lifting/gripping  Tennis elbow strap (over flexors) as prescribed by MD prn  Self massage to FA flexors with ball   Gentle, pain free prolonged FA flexor and extensor stretches  FM to MEP  Eccentric wrist strengthening  Can wean into more functional tasks being mindful of pain  Spiky ball    Next visit:   [facility]  Future visits: strength testing

## 2018-07-10 ENCOUNTER — THERAPY VISIT (OUTPATIENT)
Dept: OCCUPATIONAL THERAPY | Facility: CLINIC | Age: 48
End: 2018-07-10
Payer: COMMERCIAL

## 2018-07-10 DIAGNOSIS — M25.521 RIGHT ELBOW PAIN: ICD-10-CM

## 2018-07-10 PROCEDURE — 97035 APP MDLTY 1+ULTRASOUND EA 15: CPT | Mod: GO | Performed by: OCCUPATIONAL THERAPIST

## 2018-07-10 PROCEDURE — 97760 ORTHOTIC MGMT&TRAING 1ST ENC: CPT | Mod: GO | Performed by: OCCUPATIONAL THERAPIST

## 2018-07-10 PROCEDURE — 97140 MANUAL THERAPY 1/> REGIONS: CPT | Mod: GO | Performed by: OCCUPATIONAL THERAPIST

## 2018-07-10 NOTE — MR AVS SNAPSHOT
"              After Visit Summary   7/10/2018    Lucero Cedeno    MRN: [id number]           Patient Information     Date Of Birth          1970        Visit Information        Provider Department      7/10/2018 10:00 AM Danette Kunz [facility]        Today's Diagnoses     Right elbow pain           Follow-ups after your visit        Your next 10 appointments already scheduled     Jul 19, 2018  9:30 AM CDT   CK Hand with Dayanna Singletary OT   [facility] ([facility])    [address] [place] [address] [phone]              Who to contact     If you have questions or need follow up information about today's clinic visit or your schedule please contact [facility] directly at [phone].  Normal or non-critical lab and imaging results will be communicated to you by MyChart, letter or phone within 4 business days after the clinic has received the results. If you do not hear from us within 7 days, please contact the clinic through MyChart or phone. If you have a critical or abnormal lab result, we will notify you by phone as soon as possible.  Submit refill requests through [employer] or call your pharmacy and they will forward the refill request to us. Please allow 3 business days for your refill to be completed.          Additional Information About Your Visit        MyChart Information     [employer] lets you send messages to your doctor, view your test results, renew your prescriptions, schedule appointments and more. To sign up, go to [email or web address] . Click on \"Log in\" on the left side of the screen, which will take you to the Welcome page. Then click on \"Sign up Now\" on the right side of the page.     You will be asked to enter the access code listed below, as well as some personal information. Please follow the directions to create your username and password.     Your access code is: [email or web address]  Expires: 8/8/2018 " 11:55 AM     Your access code will  in 90 days. If you need help or a new code, please call your [facility] or [phone].        Care EveryWhere ID     This is your Care EveryWhere ID. This could be used by other organizations to access your [place] medical records  [id number]         Blood Pressure from Last 3 Encounters:   11 115/75    Weight from Last 3 Encounters:   11 71.7 kg (158 lb)              We Performed the Following     MANUAL THER TECH,1+REGIONS,EA 15 MIN     ORTHOTIC MGMT AND TRAINING, EACH 15 MIN     ULTRASOUND THERAPY        Primary Care Provider Office Phone # Fax #    Snehal Solitario [phone]       [address]  [facility] [address]        Equal Access to Services     TEJAS MUHAMMAD : Hadii susan wood hadasho Soomaali, waaxda luqadaha, qaybta kaalmada adeegyada, alfredo montez . So [facility] [phone].    ATENCIÓN: Si habla español, tiene a ramesh disposición servicios gratuitos de asistencia lingüística. Llame al [phone].    We comply with applicable federal civil rights laws and [place] laws. We do not discriminate on the basis of race, color, national origin, age, disability, sex, sexual orientation, or gender identity.            Thank you!     Thank you for choosing [facility]  for your care. Our goal is always to provide you with excellent care. Hearing back from our patients is one way we can continue to improve our services. Please take a few minutes to complete the written survey that you may receive in the mail after your visit with us. Thank you!             Your Updated Medication List - Protect others around you: Learn how to safely use, store and throw away your medicines at www.disposemymeds.org.          This list is accurate as of 7/10/18 12:59 PM.  Always use your most recent med list.                   Brand Name Dispense Instructions for use Diagnosis    CALCIUM + D PO       Take  by mouth.        FISH OIL PO      Take  by mouth.        MULTIVITAMIN PO      Take  by mouth.        spironolactone 25 MG tablet    ALDACTONE     Take 25 mg by mouth daily.        venlafaxine 50 MG tablet    EFFEXOR     Take 50 mg by mouth 3 times daily.        vitamin D 400 units capsule      Take 1 capsule by mouth daily.

## 2018-07-10 NOTE — PROGRESS NOTES
SOAP note objective information for 7/10/2018.    Please refer to the daily flowsheet for treatment today, total treatment time and time spent performing 1:1 timed codes.         Objective:  Pain Report:  VAS(0-10) 4/24/18 5/11/18 5/17/18 5/24 6/28/18 7/10/18   At Rest: 0/10 post CSI  4-5/10 pre CSI R: 0/10 R: 0/10 R:0/10 R: 4-5/10 R: 0/10   With Use: 2-3/10 post CSI  6-7/10 Post CSI R: 1-2 R: 1-2 R:0-1 R: 6-7/10 R: 6-7/10     Location:  R Medial Elbow/Medial Epicondyle  Description:  [place]  Frequency:  infrequent   Pain is worse: During the day  Pain is exacerbated by:  Patient trans  fers/lifting, moving groceries/laundry, computer use, wringing out washcloth  Pain is relieved by:  CSI, tendonitis strap, ice, ibuprofen, hydration  Progression since onset: improving    ROM: Elbow AROM (PROM)  4/24/18 Date: 4/24/18   Left Side: Right   5 Extension 5   140 Flexion 140   90 Supination 90   80 Pronation 80     ROM: Wrist AROM (PROM)  4/24 Date: 4/24 5/18   Left Side: Right Right   75 Extension 67 75   80 Flexion 75 80   25 RD 25    40 UD 37       and Pinch Strength - Deferred due to recent CSI    Edema:  [x]        None  []         Mild   []        Moderate  []         Severe     []         of affected part    Scar/Wound:  None    Sensation: [x]         WNL throughout all nerve distributions; per patient report    []         Decreased  Median  Ulnar  Radial  nerve distribution    Resisted Testing: Deferred due to recent CSI    Palpation  Date 4/24/18 5/11/18 5/17/18 6/28/18 7/10/18   Side Right Right Right Right Right   MEP NT due to injection Minimal  ++ ++ to +++   Cubital Tunnel + mild -  -    Flexor Wad neg ++ + ++ ++                     Resisted Testing 5/17/2018       FCU -       Pronator -       Finger flexors -                           Special Tests:   Tinels over Cubital Tunnel: neg      Home Program:  Avoid underhand lifting, excessive gripping, activities that aggravate pain  OTC Wrist cock up splint  for work and tasks that involve lifting/gripping  Tennis elbow strap (over flexors) as prescribed by MD prn  Self massage to FA flexors with ball   Gentle, pain free prolonged FA flexor and extensor stretches  FM to MEP  Eccentric wrist strengthening  Can wean into more functional tasks being mindful of pain  Spiky ball  Zipper orthosis to prevent full pro/sup    Next visit:   Pt. To speak to MD regarding iontophoresis  US  STM  Future visits: strength testing

## 2018-07-19 ENCOUNTER — THERAPY VISIT (OUTPATIENT)
Dept: OCCUPATIONAL THERAPY | Facility: CLINIC | Age: 48
End: 2018-07-19
Payer: COMMERCIAL

## 2018-07-19 DIAGNOSIS — M25.521 RIGHT ELBOW PAIN: ICD-10-CM

## 2018-07-19 PROCEDURE — 97140 MANUAL THERAPY 1/> REGIONS: CPT | Mod: GO | Performed by: OCCUPATIONAL THERAPIST

## 2018-07-19 PROCEDURE — 97110 THERAPEUTIC EXERCISES: CPT | Mod: GO | Performed by: OCCUPATIONAL THERAPIST

## 2018-08-21 ENCOUNTER — THERAPY VISIT (OUTPATIENT)
Dept: OCCUPATIONAL THERAPY | Facility: CLINIC | Age: 48
End: 2018-08-21
Payer: COMMERCIAL

## 2018-08-21 DIAGNOSIS — M25.521 RIGHT ELBOW PAIN: ICD-10-CM

## 2018-08-21 PROCEDURE — 97035 APP MDLTY 1+ULTRASOUND EA 15: CPT | Mod: GO | Performed by: OCCUPATIONAL THERAPIST

## 2018-08-21 PROCEDURE — 97140 MANUAL THERAPY 1/> REGIONS: CPT | Mod: GO | Performed by: OCCUPATIONAL THERAPIST

## 2018-08-21 PROCEDURE — 97110 THERAPEUTIC EXERCISES: CPT | Mod: GO | Performed by: OCCUPATIONAL THERAPIST

## 2018-08-21 NOTE — LETTER
[facility]  [address] [address]  [phone]    2018  Re: Lucero Cedeno   :   1970  MRN:  [id number]   REFERRING PHYSICIAN:   Pavithra Knutson    [facility]  Date of Initial Evaluation: 2018  Visits:  Rxs Used: 8  Reason for Referral:  Right elbow pain    EVALUATION SUMMARY  Progress Note - Hand Therapy  Current Date:  2018    Diagnosis:  R Elbow Pain (Medial Epicondylitis)  DOI: 2018  Post:  5 months  Number of visits to date:  7  Reporting period is 2018 through 2018    Upper Extremity Functional Index Score:  SCORE:   Column Totals: /80: 56   (A lower score indicates greater disability.)    Subjectve:   Subjective changes as noted by patient: I've been doing the exercises but this week we got back from [place] and I had to be responsible for all of the luggage and so its feeling more inflamed. I've been consistent with the exercises. Overall I'm noticing an improvement but the luggage thing flared it up.  Functional changes noted by patient: No Change to Recreational Activities and Household Chores  Response to previous treatment: good  Patient has noted adverse reaction to: None    Objective:  Pain Report:  VAS(0-10) 18   At Rest: 0/10 post CSI  4-5/10 pre CSI R: 0/10 R: 0/10 R:0/10 R: 4-5/10 R: 0-2/10   With Use: 2-3/10 post CSI  6-7/10 Post CSI R: 1-2 R: 1-2 R:0-1 R: 6-7/10 R: 6/10     Location:  R Medial Elbow/Medial Epicondyle  Description:  Tender sore feeling  Frequency:  infrequent   Pain is worse: During the day, has improved during the night  Pain is exacerbated by:  Patient transfers/lifting, moving groceries/laundry, wringing out washcloth   Pain is relieved by:  Splint, rest, massage/exercises  Progression since onset: exacerbated recently with luggage use, had been improving    ROM: Elbow AROM (PROM)  18 Date: 18   Left Side: Right   5 Extension 5   140  Flexion 140   90 Supination 90   80 Pronation 80     ROM: Wrist AROM (PROM)  4/24 Date: 4/24 5/18 8/21   Left Side: Right Right Right   75 Extension 67 75 75   80 Flexion 75 80 77   25 RD 25     40 UD 37        and Pinch Strength (pounds)  8/21 Date: 8/21   Left    Side  Right   82        # 1                # 2                # 3         Average 30 to pain   22  3 Point  # 1               # 2               # 3        Average 16-   20  Lateral  # 1                # 2                # 3         Average 13+     Edema:  [x]        None  []         Mild   []        Moderate  []         Severe     []         of affected part  Scar/Wound:  None  Sensation: [x]         WNL throughout all nerve distributions; per patient report    Palpation  Date 4/24/18 5/11/18 5/17/18 6/28/18 8/21/18   Side Right Right Right Right Right   MEP NT due to injection Minimal  ++ ++   Cubital Tunnel + mild -  - -   Flexor Wad neg ++ + ++ -                     Resisted Testing 5/17/2018 8/21/18      FCU - +      Pronator - +      Finger flexors - +                         Special Tests:   Tinels over Cubital Tunnel: neg     Assessment:  Response to therapy has been improvement to:  Pain:  Slight improvement in pain with use, resolving pain at rest.  Overall Assessment:  Patient would benefit from continued therapy to achieve rehab potential. Recommend a trial of iontophoresis with dexamethasone.  STG/LTG:  See goal sheet for details and updates of remaining functional limitations.     Plan:  I have re-evaluated this patient and find that the nature, scope, duration and intensity of the therapy is appropriate for the medical condition of the patient.  Frequency/Duration:  Recommend continuing to see patient  2 X week, once daily  for 6-8 additional visits to incorporate iontophoresis into POC.    Home Program:  Avoid underhand lifting, excessive gripping, activities that aggravate pain  OTC Wrist cock up splint for work and tasks that  involve lifting/gripping  Tennis elbow strap (over flexors) as prescribed by MD prn  Self massage to FA flexors with ball   Gentle, pain free prolonged FA flexor and extensor stretches  FM to MEP  Eccentric wrist strengthening - on hold due to exacerbated symptoms  Can wean into more functional tasks being mindful of pain  Spiky ball     Next visit:   Ionto  STM  See how splint weaning is going  Future visits: resume eccentrics when pain reduces    Please refer to the daily flowsheet for treatment today, total treatment time and time spent performing 1:1 timed codes.       Thank you for your referral.    INQUIRIES  Therapist: Faby Tracy MS, OTR/L, CHT  [facility]  [address] [place] [address]  Phone: [phone]  Fax: [phone]

## 2018-08-28 ENCOUNTER — THERAPY VISIT (OUTPATIENT)
Dept: OCCUPATIONAL THERAPY | Facility: CLINIC | Age: 48
End: 2018-08-28
Payer: COMMERCIAL

## 2018-08-28 DIAGNOSIS — M65.929 TENOSYNOVITIS OF ELBOW: ICD-10-CM

## 2018-08-28 DIAGNOSIS — M25.521 RIGHT ELBOW PAIN: ICD-10-CM

## 2018-08-28 PROCEDURE — 97033 APP MDLTY 1+IONTPHRSIS EA 15: CPT | Mod: GO | Performed by: OCCUPATIONAL THERAPIST

## 2018-08-30 ENCOUNTER — THERAPY VISIT (OUTPATIENT)
Dept: OCCUPATIONAL THERAPY | Facility: CLINIC | Age: 48
End: 2018-08-30
Payer: COMMERCIAL

## 2018-08-30 DIAGNOSIS — M65.929 TENOSYNOVITIS OF ELBOW: ICD-10-CM

## 2018-08-30 DIAGNOSIS — M25.521 RIGHT ELBOW PAIN: ICD-10-CM

## 2018-08-30 PROCEDURE — 97033 APP MDLTY 1+IONTPHRSIS EA 15: CPT | Mod: GO | Performed by: OCCUPATIONAL THERAPIST

## 2018-09-04 ENCOUNTER — THERAPY VISIT (OUTPATIENT)
Dept: OCCUPATIONAL THERAPY | Facility: CLINIC | Age: 48
End: 2018-09-04
Payer: COMMERCIAL

## 2018-09-04 DIAGNOSIS — M65.929 TENOSYNOVITIS OF ELBOW: ICD-10-CM

## 2018-09-04 DIAGNOSIS — M25.521 RIGHT ELBOW PAIN: ICD-10-CM

## 2018-09-04 PROCEDURE — 97140 MANUAL THERAPY 1/> REGIONS: CPT | Mod: GO | Performed by: OCCUPATIONAL THERAPIST

## 2018-09-04 PROCEDURE — 97033 APP MDLTY 1+IONTPHRSIS EA 15: CPT | Mod: GO | Performed by: OCCUPATIONAL THERAPIST

## 2018-09-04 NOTE — PROGRESS NOTES
SOAP Note Objective Information for 9/4/2018:    Pain Report:  VAS(0-10) 4/24/18 5/11/18 5/17/18 6/28/18 8/21/18 9/4/18   At Rest: 0/10 post CSI  4-5/10 pre CSI R: 0/10 R: 0/10 R: 4-5/10 R: 0-2/10 0/10   With Use: 2-3/10 post CSI  6-7/10 Post CSI R: 1-2 R: 1-2 R: 6-7/10 R: 6/10 5/10     Location:  R Medial Elbow/Medial Epicondyle  Description:  Tender sore feeling   Frequency:  infrequent   Pain is worse: During the day, has improved during the night  Pain is exacerbated by:  Patient transfers/lifting, moving groceries/laundry, wringing out washcloth   Pain is relieved by:  Splint, rest, massage/exercises  Progression since onset: exacerbated by banging elbow yesterday on chair    Home Program:  Avoid underhand lifting, excessive gripping, activities that aggravate pain  OTC Wrist cock up splint for work and tasks that involve lifting/gripping  Tennis elbow strap (over flexors) as prescribed by MD prn  Self massage to FA flexors with ball   Gentle, pain free prolonged FA flexor and extensor stretches  FM to MEP  Eccentric wrist strengthening - on hold due to exacerbated symptoms   Can wean into more functional tasks being mindful of pain  Spiky ball     Next visit:   Ionto  STM  Stretches  Future visits: resume eccentrics when pain reduces    Please refer to the daily flowsheet for treatment today, total treatment time and time spent performing 1:1 timed codes.

## 2018-09-07 ENCOUNTER — THERAPY VISIT (OUTPATIENT)
Dept: OCCUPATIONAL THERAPY | Facility: CLINIC | Age: 48
End: 2018-09-07
Payer: COMMERCIAL

## 2018-09-07 DIAGNOSIS — M25.521 RIGHT ELBOW PAIN: ICD-10-CM

## 2018-09-07 DIAGNOSIS — M65.929 TENOSYNOVITIS OF ELBOW: ICD-10-CM

## 2018-09-07 PROCEDURE — 97140 MANUAL THERAPY 1/> REGIONS: CPT | Mod: GO | Performed by: OCCUPATIONAL THERAPIST

## 2018-09-07 PROCEDURE — 97110 THERAPEUTIC EXERCISES: CPT | Mod: GO | Performed by: OCCUPATIONAL THERAPIST

## 2018-09-07 PROCEDURE — 97033 APP MDLTY 1+IONTPHRSIS EA 15: CPT | Mod: GO | Performed by: OCCUPATIONAL THERAPIST

## 2018-09-10 ENCOUNTER — THERAPY VISIT (OUTPATIENT)
Dept: OCCUPATIONAL THERAPY | Facility: CLINIC | Age: 48
End: 2018-09-10
Payer: COMMERCIAL

## 2018-09-10 DIAGNOSIS — M65.929 TENOSYNOVITIS OF ELBOW: ICD-10-CM

## 2018-09-10 DIAGNOSIS — M25.521 RIGHT ELBOW PAIN: ICD-10-CM

## 2018-09-10 PROCEDURE — 97140 MANUAL THERAPY 1/> REGIONS: CPT | Mod: GO | Performed by: OCCUPATIONAL THERAPIST

## 2018-09-10 PROCEDURE — 97035 APP MDLTY 1+ULTRASOUND EA 15: CPT | Mod: GO | Performed by: OCCUPATIONAL THERAPIST

## 2018-09-10 PROCEDURE — 97112 NEUROMUSCULAR REEDUCATION: CPT | Mod: GO | Performed by: OCCUPATIONAL THERAPIST

## 2018-09-10 PROCEDURE — 97033 APP MDLTY 1+IONTPHRSIS EA 15: CPT | Mod: GO | Performed by: OCCUPATIONAL THERAPIST

## 2018-09-11 NOTE — PROGRESS NOTES
SOAP Note Objective Information for 9/10/2018:    Pain Report:  VAS(0-10) 4/24/18 5/11/18 5/17/18 6/28/18 8/21/18 9/4/18   At Rest: 0/10 post CSI  4-5/10 pre CSI R: 0/10 R: 0/10 R: 4-5/10 R: 0-2/10 0/10   With Use: 2-3/10 post CSI  6-7/10 Post CSI R: 1-2 R: 1-2 R: 6-7/10 R: 6/10 5/10     VAS(0-10) 9/10/2018        At Rest: 0/10        With Use: 4/10            Location:  R Medial Elbow/Medial Epicondyle  Description:  Tender sore feeling   Frequency:  infrequent   Pain is worse: During the day, has improved during the night  Pain is exacerbated by:  Patient transfers/lifting, moving groceries/laundry, wringing out washcloth   Pain is relieved by:  Splint, rest, massage/exercises  Progression since onset: exacerbated by banging elbow yesterday on chair    Palpation  Date 4/24/18 5/11/18 5/17/18 6/28/18 8/21/18 9/10/18   Side Right Right Right Right Right Right   MEP NT due to injection Minimal  ++ ++ + to ++   Cubital Tunnel + mild -  - - -   Flexor Wad neg ++ + ++ - -                       Resisted Testing 5/17/18 8/21/18 9/10/18     FCU - + +     Pronator - + +     Finger flexors - +  +                       ULTT 9/10/2018       Ulnar ~30%                                             Special Tests:   Tinels over Cubital Tunnel: neg       Home Program:  Avoid underhand lifting, excessive gripping, activities that aggravate pain  OTC Wrist cock up splint for work and tasks that involve lifting/gripping  Tennis elbow strap (over flexors) as prescribed by MD prn  Self massage to FA flexors with ball   Gentle, pain free prolonged FA flexor and extensor stretches  FM to MEP  Eccentric wrist strengthening - on hold due to exacerbated symptoms   Can wean into more functional tasks being mindful of pain  Spiky ball   Ulnar nerve glide (review again.  Went over quickly)    Next visit:   Ulnar nerve glide (review again.  Went over quickly)  Ionto  STM  Stretches  Future visits: resume eccentrics when pain reduces    Please  refer to the daily flowsheet for treatment today, total treatment time and time spent performing 1:1 timed codes.

## 2018-09-14 ENCOUNTER — THERAPY VISIT (OUTPATIENT)
Dept: OCCUPATIONAL THERAPY | Facility: CLINIC | Age: 48
End: 2018-09-14
Payer: COMMERCIAL

## 2018-09-14 DIAGNOSIS — M65.929 TENOSYNOVITIS OF ELBOW: ICD-10-CM

## 2018-09-14 DIAGNOSIS — M25.521 RIGHT ELBOW PAIN: ICD-10-CM

## 2018-09-14 PROCEDURE — 97033 APP MDLTY 1+IONTPHRSIS EA 15: CPT | Mod: GO | Performed by: OCCUPATIONAL THERAPIST

## 2018-09-14 PROCEDURE — 97112 NEUROMUSCULAR REEDUCATION: CPT | Mod: GO | Performed by: OCCUPATIONAL THERAPIST

## 2018-09-14 PROCEDURE — 97035 APP MDLTY 1+ULTRASOUND EA 15: CPT | Mod: GO | Performed by: OCCUPATIONAL THERAPIST

## 2018-09-14 PROCEDURE — 97140 MANUAL THERAPY 1/> REGIONS: CPT | Mod: GO | Performed by: OCCUPATIONAL THERAPIST

## 2018-09-18 ENCOUNTER — THERAPY VISIT (OUTPATIENT)
Dept: OCCUPATIONAL THERAPY | Facility: CLINIC | Age: 48
End: 2018-09-18
Payer: COMMERCIAL

## 2018-09-18 DIAGNOSIS — M65.929 TENOSYNOVITIS OF ELBOW: ICD-10-CM

## 2018-09-18 DIAGNOSIS — M25.521 RIGHT ELBOW PAIN: ICD-10-CM

## 2018-09-18 PROCEDURE — 97033 APP MDLTY 1+IONTPHRSIS EA 15: CPT | Mod: GO | Performed by: OCCUPATIONAL THERAPIST

## 2018-09-18 PROCEDURE — 97140 MANUAL THERAPY 1/> REGIONS: CPT | Mod: GO | Performed by: OCCUPATIONAL THERAPIST

## 2018-09-18 PROCEDURE — 97035 APP MDLTY 1+ULTRASOUND EA 15: CPT | Mod: GO | Performed by: OCCUPATIONAL THERAPIST

## 2018-09-18 PROCEDURE — 97112 NEUROMUSCULAR REEDUCATION: CPT | Mod: GO | Performed by: OCCUPATIONAL THERAPIST

## 2018-09-18 NOTE — PROGRESS NOTES
SOAP Note Objective Information for 9/18/2018:    Pain Report:  VAS(0-10) 4/24/18 5/11/18 5/17/18 6/28/18 8/21/18 9/4/18   At Rest: 0/10 post CSI  4-5/10 pre CSI R: 0/10 R: 0/10 R: 4-5/10 R: 0-2/10 0/10   With Use: 2-3/10 post CSI  6-7/10 Post CSI R: 1-2 R: 1-2 R: 6-7/10 R: 6/10 5/10     VAS(0-10) 9/10/2018 9/18/18       At Rest: 0/10 0-2/10       With Use: 4/10 0-4/10         Location:  R Medial Elbow/Medial Epicondyle  Description:  Tender sore feeling, bruised  Frequency:  infrequent   Pain is worse: During the day, has improved during the night  Pain is exacerbated by:  Patient transfers/lifting, moving groceries/laundry, wringing out washcloth   Pain is relieved by:  Splint, rest, massage/exercises  Progression since onset: improving    Home Program:  Avoid underhand lifting, excessive gripping, activities that aggravate pain  OTC Wrist cock up splint for work and tasks that involve lifting/gripping  Tennis elbow strap (over flexors) as prescribed by MD prn  Self massage to FA flexors with ball   Gentle, pain free prolonged FA flexor and extensor stretches  FM to MEP  Eccentric wrist strengthening - on hold due to exacerbated symptoms   Can wean into more functional tasks being mindful of pain  Spiky ball   Ulnar nerve glide    Next visit:   Ulnar nerve glide   Ionto  STM  Stretches  Future visits: resume eccentrics when pain reduces    Please refer to the daily flowsheet for treatment today, total treatment time and time spent performing 1:1 timed codes.

## 2018-09-20 ENCOUNTER — THERAPY VISIT (OUTPATIENT)
Dept: OCCUPATIONAL THERAPY | Facility: CLINIC | Age: 48
End: 2018-09-20
Payer: COMMERCIAL

## 2018-09-20 DIAGNOSIS — M65.929 TENOSYNOVITIS OF ELBOW: ICD-10-CM

## 2018-09-20 DIAGNOSIS — M25.521 RIGHT ELBOW PAIN: ICD-10-CM

## 2018-09-20 PROCEDURE — 97033 APP MDLTY 1+IONTPHRSIS EA 15: CPT | Mod: GO | Performed by: OCCUPATIONAL THERAPIST

## 2018-09-20 PROCEDURE — 97140 MANUAL THERAPY 1/> REGIONS: CPT | Mod: GO | Performed by: OCCUPATIONAL THERAPIST

## 2018-09-20 PROCEDURE — 97035 APP MDLTY 1+ULTRASOUND EA 15: CPT | Mod: GO | Performed by: OCCUPATIONAL THERAPIST

## 2018-09-24 ENCOUNTER — THERAPY VISIT (OUTPATIENT)
Dept: OCCUPATIONAL THERAPY | Facility: CLINIC | Age: 48
End: 2018-09-24
Payer: COMMERCIAL

## 2018-09-24 DIAGNOSIS — M65.929 TENOSYNOVITIS OF ELBOW: ICD-10-CM

## 2018-09-24 DIAGNOSIS — M25.521 RIGHT ELBOW PAIN: ICD-10-CM

## 2018-09-24 PROCEDURE — 97035 APP MDLTY 1+ULTRASOUND EA 15: CPT | Mod: GO | Performed by: OCCUPATIONAL THERAPIST

## 2018-09-24 PROCEDURE — 97033 APP MDLTY 1+IONTPHRSIS EA 15: CPT | Mod: GO | Performed by: OCCUPATIONAL THERAPIST

## 2018-09-24 PROCEDURE — 97140 MANUAL THERAPY 1/> REGIONS: CPT | Mod: GO | Performed by: OCCUPATIONAL THERAPIST

## 2018-09-24 NOTE — MR AVS SNAPSHOT
After Visit Summary   9/24/2018    Lucero Cedeno    MRN: [id number]           Patient Information     Date Of Birth          1970        Visit Information        Provider Department      9/24/2018 12:30 PM Danette Knuz [facility]        Today's Diagnoses     Tenosynovitis of elbow        Right elbow pain           Follow-ups after your visit        Your next 10 appointments already scheduled     Oct 01, 2018 10:30 AM CDT   CK Hand with Danette Ze   [facility] ([facility])    [address] [id number] [phone]            Oct 05, 2018 10:00 AM CDT   CK Hand with Faby Tracy OT   [facility] ([facility])    [address] [id number] [phone]              Who to contact     If you have questions or need follow up information about today's clinic visit or your schedule please contact [facility] [place] directly at [phone].  Normal or non-critical lab and imaging results will be communicated to you by MyChart, letter or phone within 4 business days after the clinic has received the results. If you do not hear from us within 7 days, please contact the clinic through MyChart or phone. If you have a critical or abnormal lab result, we will notify you by phone as soon as possible.  Submit refill requests through [facility] or call your pharmacy and they will forward the refill request to us. Please allow 3 business days for your refill to be completed.          Additional Information About Your Visit        Care EveryWhere ID     This is your Care EveryWhere ID. This could be used by other organizations to access your [facility] medical records  [id number]         Blood Pressure from Last 3 Encounters:   06/21/11 115/75    Weight from Last 3 Encounters:   06/21/11 71.7 kg (158 lb)              We Performed the Following     ELECTRIC CURRENT THERAPY     MANUAL THER TECH,1+REGIONS,EA  15 MIN     ULTRASOUND THERAPY        Primary Care Provider Office Phone # Fax #    Snehla Solitario [phone]       [address] [place]  [facility] [address]        Equal Access to Services     TEJAS NAIDU: Alba wood madhuo Sosamali, waaxda luqadaha, qaybta kaalmada adejeanyada, alfredo gonzalez tc naidu. So [facility] [phone].    ATENCIÓN: Si habla español, tiene a ramesh disposición servicios gratuitos de asistencia lingüística. [facility] [phone].    We comply with applicable federal civil rights laws and [place] laws. We do not discriminate on the basis of race, color, national origin, age, disability, sex, sexual orientation, or gender identity.            Thank you!     Thank you for choosing [facility]  for your care. Our goal is always to provide you with excellent care. Hearing back from our patients is one way we can continue to improve our services. Please take a few minutes to complete the written survey that you may receive in the mail after your visit with us. Thank you!             Your Updated Medication List - Protect others around you: Learn how to safely use, store and throw away your medicines at www.disposemymeds.org.          This list is accurate as of 9/24/18  2:19 PM.  Always use your most recent med list.                   Brand Name Dispense Instructions for use Diagnosis    CALCIUM + D PO      Take  by mouth.        FISH OIL PO      Take  by mouth.        MULTIVITAMIN PO      Take  by mouth.        spironolactone 25 MG tablet    ALDACTONE     Take 25 mg by mouth daily.        venlafaxine 50 MG tablet    EFFEXOR     Take 50 mg by mouth 3 times daily.        vitamin D 400 units capsule      Take 1 capsule by mouth daily.

## 2018-09-24 NOTE — PROGRESS NOTES
SOAP Note Objective Information for 9/24/2018:    Pain Report:  VAS(0-10) 4/24/18 5/11/18 5/17/18 6/28/18 8/21/18 9/4/18   At Rest: 0/10 post CSI  4-5/10 pre CSI R: 0/10 R: 0/10 R: 4-5/10 R: 0-2/10 0/10   With Use: 2-3/10 post CSI  6-7/10 Post CSI R: 1-2 R: 1-2 R: 6-7/10 R: 6/10 5/10     VAS(0-10) 9/10/2018 9/18/18 9/24/18      At Rest: 0/10 0-2/10 0-2      With Use: 4/10 0-4/10 0-2        Location:  R Medial Elbow/Medial Epicondyle  Description:  Tender sore feeling, bruised  Frequency:  infrequent   Pain is worse: During the day, has improved during the night  Pain is exacerbated by:  Patient transfers/lifting, moving groceries/laundry, wringing out washcloth   Pain is relieved by:  Splint, rest, massage/exercises  Progression since onset: improving    Palpation  Date 4/24/18 5/11/18 5/17/18 6/28/18 8/21/18 9/10/18   Side Right Right Right Right Right Right   MEP NT due to injection Minimal  ++ ++ + to ++   Cubital Tunnel + mild -  - - -   Flexor Wad neg ++ + ++ - -                       Date 9/24/2018        Side Right        MEP -        Cubital Tunnel -        Flexor Wad +                              Resisted Testing 5/17/18 8/21/18 9/10/18 9/24/18    FCU - + + -    Pronator - + + +    Finger flexors - +  + +                      ULTT 9/10/18 9/24/18      Ulnar ~30% NT                                            Special Tests:   Tinels over Cubital Tunnel: neg     Home Program:  Avoid underhand lifting, excessive gripping, activities that aggravate pain  OTC Wrist cock up splint for work and tasks that involve lifting/gripping  Tennis elbow strap (over flexors) as prescribed by MD prn  Self massage to FA flexors with ball   Gentle, pain free prolonged FA flexor and extensor stretches  FM to MEP  Eccentric wrist strengthening - on hold due to exacerbated symptoms   Can wean into more functional tasks being mindful of pain  Spiky ball   Ulnar nerve glide    Next visit:   Ulnar nerve glide    Ionto  STM  Stretches  Future visits: resume eccentrics when pain reduces    Please refer to the daily flowsheet for treatment today, total treatment time and time spent performing 1:1 timed codes.

## 2018-10-01 ENCOUNTER — THERAPY VISIT (OUTPATIENT)
Dept: OCCUPATIONAL THERAPY | Facility: CLINIC | Age: 48
End: 2018-10-01
Payer: COMMERCIAL

## 2018-10-01 DIAGNOSIS — M25.521 RIGHT ELBOW PAIN: ICD-10-CM

## 2018-10-01 DIAGNOSIS — M65.929 TENOSYNOVITIS OF ELBOW: ICD-10-CM

## 2018-10-01 PROCEDURE — 97140 MANUAL THERAPY 1/> REGIONS: CPT | Mod: GO | Performed by: OCCUPATIONAL THERAPIST

## 2018-10-01 PROCEDURE — 97033 APP MDLTY 1+IONTPHRSIS EA 15: CPT | Mod: GO | Performed by: OCCUPATIONAL THERAPIST

## 2018-10-01 PROCEDURE — 97035 APP MDLTY 1+ULTRASOUND EA 15: CPT | Mod: GO | Performed by: OCCUPATIONAL THERAPIST

## 2018-10-01 NOTE — PROGRESS NOTES
SOAP Note Objective Information for 10/1/2018:    Pain Report:  VAS(0-10) 4/24/18 5/11/18 5/17/18 6/28/18 8/21/18 9/4/18   At Rest: 0/10 post CSI  4-5/10 pre CSI R: 0/10 R: 0/10 R: 4-5/10 R: 0-2/10 0/10   With Use: 2-3/10 post CSI  6-7/10 Post CSI R: 1-2 R: 1-2 R: 6-7/10 R: 6/10 5/10     VAS(0-10) 9/10/2018 9/18/18 9/24/18 10/1/18     At Rest: 0/10 0-2/10 0-2 0-1     With Use: 4/10 0-4/10 0-2 0-1       Location:  R Medial Elbow/Medial Epicondyle  Description:  Tender sore feeling, bruised  Frequency:  infrequent   Pain is worse: During the day, has improved during the night  Pain is exacerbated by:  Patient transfers/lifting, moving groceries/laundry, wringing out washcloth   Pain is relieved by:  Splint, rest, massage/exercises  Progression since onset: improving    Palpation  Date 4/24/18 5/11/18 5/17/18 6/28/18 8/21/18 9/10/18   Side Right Right Right Right Right Right   MEP NT due to injection Minimal  ++ ++ + to ++   Cubital Tunnel + mild -  - - -   Flexor Wad neg ++ + ++ - -                       Date 9/24/2018 10/1/18       Side Right Right       MEP -        Cubital Tunnel -        Flexor Wad + +                             Resisted Testing 5/17/18 8/21/18 9/10/18 9/24/18 10/1/18   FCU - + + -    Pronator - + + + +   Finger flexors - +  + + -                     ULTT 9/10/18 9/24/18 10/1/18     Ulnar ~30% NT NT                                           Special Tests:   Tinels over Cubital Tunnel: neg     Home Program:  Avoid underhand lifting, excessive gripping, activities that aggravate pain  OTC Wrist cock up splint for work and tasks that involve lifting/gripping  Tennis elbow strap (over flexors) as prescribed by MD prn  Self massage to FA flexors with ball   Gentle, pain free prolonged FA flexor and extensor stretches  FM to MEP  Eccentric wrist strengthening - on hold due to exacerbated symptoms   Can wean into more functional tasks being mindful of pain  Spiky ball   Ulnar nerve glide    Next visit:    Ulnar nerve glide   Ionto  STM  Stretches  Future visits: resume eccentrics when pain reduces    Please refer to the daily flowsheet for treatment today, total treatment time and time spent performing 1:1 timed codes.

## 2018-10-01 NOTE — MR AVS SNAPSHOT
After Visit Summary   10/1/2018    Lucero Cedeno    MRN: [id number]           Patient Information     Date Of Birth          1970        Visit Information        Provider Department      10/1/2018 10:30 AM Danette Kunz [facility]        Today's Diagnoses     Tenosynovitis of elbow        Right elbow pain           Follow-ups after your visit        Your next 10 appointments already scheduled     Oct 05, 2018 10:00 AM CDT   CK Hand with Faby Tracy, OT   [facility] ([facility])    [address] [place] [id number] [phone]              Who to contact     If you have questions or need follow up information about today's clinic visit or your schedule please contact [facility] directly at [phone].  Normal or non-critical lab and imaging results will be communicated to you by MyChart, letter or phone within 4 business days after the clinic has received the results. If you do not hear from us within 7 days, please contact the clinic through MyChart or phone. If you have a critical or abnormal lab result, we will notify you by phone as soon as possible.  Submit refill requests through [facility] or call your pharmacy and they will forward the refill request to us. Please allow 3 business days for your refill to be completed.          Additional Information About Your Visit        Care EveryWhere ID     This is your Care EveryWhere ID. This could be used by other organizations to access your [place] medical records  [id number]         Blood Pressure from Last 3 Encounters:   06/21/11 115/75    Weight from Last 3 Encounters:   06/21/11 71.7 kg (158 lb)              We Performed the Following     ELECTRIC CURRENT THERAPY     MANUAL THER TECH,1+REGIONS,EA 15 MIN     ULTRASOUND THERAPY        Primary Care Provider Office Phone # Fax #    Snehal Solitario [phone]       [address]   [place] [address]        Equal Access to Services     [facility]: Hadii susan wood geo Centeno, wajuanada luqmiaha, qaybta kaalmaandres iverson, alfredo naidu. So [facility] [phone].    ATENCIÓN: Si habla español, tiene a ramesh disposición servicios gratuitos de asistencia lingüística. Tiffanyame al [phone].    We comply with applicable federal civil rights laws and [place] laws. We do not discriminate on the basis of race, color, national origin, age, disability, sex, sexual orientation, or gender identity.            Thank you!     Thank you for choosing [facility] [place]  for your care. Our goal is always to provide you with excellent care. Hearing back from our patients is one way we can continue to improve our services. Please take a few minutes to complete the written survey that you may receive in the mail after your visit with us. Thank you!             Your Updated Medication List - Protect others around you: Learn how to safely use, store and throw away your medicines at www.disposemymeds.org.          This list is accurate as of 10/1/18 12:03 PM.  Always use your most recent med list.                   Brand Name Dispense Instructions for use Diagnosis    CALCIUM + D PO      Take  by mouth.        FISH OIL PO      Take  by mouth.        MULTIVITAMIN PO      Take  by mouth.        spironolactone 25 MG tablet    ALDACTONE     Take 25 mg by mouth daily.        venlafaxine 50 MG tablet    EFFEXOR     Take 50 mg by mouth 3 times daily.        vitamin D 400 units capsule      Take 1 capsule by mouth daily.

## 2018-10-05 ENCOUNTER — THERAPY VISIT (OUTPATIENT)
Dept: OCCUPATIONAL THERAPY | Facility: CLINIC | Age: 48
End: 2018-10-05
Payer: COMMERCIAL

## 2018-10-05 DIAGNOSIS — M65.929 TENOSYNOVITIS OF ELBOW: ICD-10-CM

## 2018-10-05 DIAGNOSIS — M25.521 RIGHT ELBOW PAIN: ICD-10-CM

## 2018-10-05 PROCEDURE — 97035 APP MDLTY 1+ULTRASOUND EA 15: CPT | Mod: GO | Performed by: OCCUPATIONAL THERAPIST

## 2018-10-05 PROCEDURE — 97140 MANUAL THERAPY 1/> REGIONS: CPT | Mod: GO | Performed by: OCCUPATIONAL THERAPIST

## 2018-10-05 PROCEDURE — 97033 APP MDLTY 1+IONTPHRSIS EA 15: CPT | Mod: GO | Performed by: OCCUPATIONAL THERAPIST

## 2018-10-08 ENCOUNTER — THERAPY VISIT (OUTPATIENT)
Dept: OCCUPATIONAL THERAPY | Facility: CLINIC | Age: 48
End: 2018-10-08
Payer: COMMERCIAL

## 2018-10-08 DIAGNOSIS — M65.929 TENOSYNOVITIS OF ELBOW: ICD-10-CM

## 2018-10-08 DIAGNOSIS — M25.521 RIGHT ELBOW PAIN: ICD-10-CM

## 2018-10-08 PROCEDURE — 97140 MANUAL THERAPY 1/> REGIONS: CPT | Mod: GO | Performed by: OCCUPATIONAL THERAPIST

## 2018-10-08 PROCEDURE — 97033 APP MDLTY 1+IONTPHRSIS EA 15: CPT | Mod: GO | Performed by: OCCUPATIONAL THERAPIST

## 2018-10-08 PROCEDURE — 97035 APP MDLTY 1+ULTRASOUND EA 15: CPT | Mod: GO | Performed by: OCCUPATIONAL THERAPIST

## 2018-10-08 NOTE — PROGRESS NOTES
SOAP Note Objective Information for 10/8/2018:    Pain Report:  VAS(0-10) 4/24/18 5/11/18 5/17/18 6/28/18 8/21/18 9/4/18   At Rest: 0/10 post CSI  4-5/10 pre CSI R: 0/10 R: 0/10 R: 4-5/10 R: 0-2/10 0/10   With Use: 2-3/10 post CSI  6-7/10 Post CSI R: 1-2 R: 1-2 R: 6-7/10 R: 6/10 5/10     VAS(0-10) 9/10/2018 9/18/18 9/24/18 10/1/18 10/8/18    At Rest: 0/10 0-2/10 0-2 0-1 0-1    With Use: 4/10 0-4/10 0-2 0-1 0-2      Location:  R Medial Elbow/Medial Epicondyle  Description:  Tender sore feeling, bruised  Frequency:  infrequent   Pain is worse: During the day, has improved during the night  Pain is exacerbated by:  Patient transfers/lifting, moving groceries/laundry, wringing out washcloth   Pain is relieved by:  Splint, rest, massage/exercises  Progression since onset: improving    Palpation  Date 4/24/18 5/11/18 5/17/18 6/28/18 8/21/18 9/10/18   Side Right Right Right Right Right Right   MEP NT due to injection Minimal  ++ ++ + to ++   Cubital Tunnel + mild -  - - -   Flexor Wad neg ++ + ++ - -                       Date 9/24/2018 10/1/18 10/8/18      Side Right Right Right      MEP - min +      Cubital Tunnel -        Flexor Wad + + +                            Resisted Testing 5/17/18 8/21/18 9/10/18 9/24/18 10/1/18   FCU - + + -    Pronator - + + + +   Finger flexors - +  + + -                     ULTT 9/10/18 9/24/18 10/1/18 10/8/18    Ulnar ~30% NT NT NT                                          Special Tests:   Tinels over Cubital Tunnel: neg     Home Program:  Avoid underhand lifting, excessive gripping, activities that aggravate pain  OTC Wrist cock up splint for work and tasks that involve lifting/gripping  Tennis elbow strap (over flexors) as prescribed by MD prn  Self massage to FA flexors with ball   Gentle, pain free prolonged FA flexor and extensor stretches  FM to MEP  Eccentric wrist strengthening - on hold due to exacerbated symptoms   Can wean into more functional tasks being mindful of pain  Spiky  ball   Ulnar nerve glide    Next visit:   Ulnar nerve glide   Ionto  STM  Stretches  Future visits: resume eccentrics when pain reduces    Please refer to the daily flowsheet for treatment today, total treatment time and time spent performing 1:1 timed codes.

## 2018-10-11 ENCOUNTER — THERAPY VISIT (OUTPATIENT)
Dept: OCCUPATIONAL THERAPY | Facility: CLINIC | Age: 48
End: 2018-10-11
Payer: COMMERCIAL

## 2018-10-11 DIAGNOSIS — M25.521 RIGHT ELBOW PAIN: ICD-10-CM

## 2018-10-11 DIAGNOSIS — M65.929 TENOSYNOVITIS OF ELBOW: ICD-10-CM

## 2018-10-11 PROCEDURE — 97033 APP MDLTY 1+IONTPHRSIS EA 15: CPT | Mod: GO | Performed by: OCCUPATIONAL THERAPIST

## 2018-10-11 PROCEDURE — 97140 MANUAL THERAPY 1/> REGIONS: CPT | Mod: GO | Performed by: OCCUPATIONAL THERAPIST

## 2018-10-11 PROCEDURE — 97035 APP MDLTY 1+ULTRASOUND EA 15: CPT | Mod: GO | Performed by: OCCUPATIONAL THERAPIST

## 2018-10-15 ENCOUNTER — THERAPY VISIT (OUTPATIENT)
Dept: OCCUPATIONAL THERAPY | Facility: CLINIC | Age: 48
End: 2018-10-15
Payer: COMMERCIAL

## 2018-10-15 DIAGNOSIS — M65.929 TENOSYNOVITIS OF ELBOW: ICD-10-CM

## 2018-10-15 DIAGNOSIS — M25.521 RIGHT ELBOW PAIN: ICD-10-CM

## 2018-10-15 PROCEDURE — 97035 APP MDLTY 1+ULTRASOUND EA 15: CPT | Mod: GO | Performed by: OCCUPATIONAL THERAPIST

## 2018-10-15 PROCEDURE — 97140 MANUAL THERAPY 1/> REGIONS: CPT | Mod: GO | Performed by: OCCUPATIONAL THERAPIST

## 2018-10-15 PROCEDURE — 97033 APP MDLTY 1+IONTPHRSIS EA 15: CPT | Mod: GO | Performed by: OCCUPATIONAL THERAPIST

## 2018-10-15 NOTE — MR AVS SNAPSHOT
After Visit Summary   10/15/2018    Lucero Cedeno    MRN: [id number]           Patient Information     Date Of Birth          1970        Visit Information        Provider Department      10/15/2018 1:30 PM Danette Kunz [facility]        Today's Diagnoses     Tenosynovitis of elbow        Right elbow pain           Follow-ups after your visit        Your next 10 appointments already scheduled     Oct 19, 2018 10:00 AM CDT   CK Hand with Faby Tracy, OT   [facility] ([facility])    [address] [place] [address]   [phone]            Oct 22, 2018  1:30 PM CDT   CK Hand with Danette Kunz   [facility] ([facility])    [address] [address]   [phone]            Oct 26, 2018 10:00 AM CDT   CK Hand with Faby Tracy OT   [facility]    [address] [address]   [phone]            Oct 29, 2018 10:00 AM CDT   CK Hand with Danette Kunz   [facility]    [address] [place] [address]   [phone]            Nov 01, 2018 10:00 AM CDT   [facility]    [address] [address]   [phone]              Who to contact     If you have questions or need follow up information about today's clinic visit or your schedule please contact [facility] directly at [phone].  Normal or non-critical lab and imaging results will be communicated to you by MyChart, letter or phone within 4 business days after the clinic has received the results. If you do not hear from us within 7 days, please contact the clinic through MyChart or phone. If you have a critical or abnormal lab result, we will notify you by phone as soon as possible.  Submit refill requests through [facility] or call your  pharmacy and they will forward the refill request to us. Please allow 3 business days for your refill to be completed.          Additional Information About Your Visit        Care EveryWhere ID     This is your Care EveryWhere ID. This could be used by other organizations to access your [place] medical records  [id number]         Blood Pressure from Last 3 Encounters:   06/21/11 115/75    Weight from Last 3 Encounters:   06/21/11 71.7 kg (158 lb)              We Performed the Following     ELECTRIC CURRENT THERAPY     MANUAL THER TECH,1+REGIONS,EA 15 MIN     ULTRASOUND THERAPY        Primary Care Provider Office Phone # Fax #    Snehal Solitario [phone]       [address] [place] [id number]        Equal Access to Services     TEJAS MUHAMMAD : Hadii aad ku hadasho Sosamali, waaxda luqadaha, qaybta kaalmada adeegyada, alfredo montez . So [facility] [phone].    ATENCIÓN: Si habla español, tiene a ramesh disposición servicios gratuitos de asistencia lingüística. [facility] [phone].    We comply with applicable federal civil rights laws and [place] laws. We do not discriminate on the basis of race, color, national origin, age, disability, sex, sexual orientation, or gender identity.            Thank you!     Thank you for choosing [facility]  for your care. Our goal is always to provide you with excellent care. Hearing back from our patients is one way we can continue to improve our services. Please take a few minutes to complete the written survey that you may receive in the mail after your visit with us. Thank you!             Your Updated Medication List - Protect others around you: Learn how to safely use, store and throw away your medicines at www.disposemymeds.org.          This list is accurate as of 10/15/18  2:00 PM.  Always use your most recent med list.                   Brand Name Dispense Instructions for use Diagnosis     CALCIUM + D PO      Take  by mouth.        FISH OIL PO      Take  by mouth.        MULTIVITAMIN PO      Take  by mouth.        spironolactone 25 MG tablet    ALDACTONE     Take 25 mg by mouth daily.        venlafaxine 50 MG tablet    EFFEXOR     Take 50 mg by mouth 3 times daily.        vitamin D 400 units capsule      Take 1 capsule by mouth daily.

## 2018-10-15 NOTE — PROGRESS NOTES
SOAP Note Objective Information for 10/15/2018:    Pain Report:  VAS(0-10) 4/24/18 5/11/18 5/17/18 6/28/18 8/21/18 9/4/18   At Rest: 0/10 post CSI  4-5/10 pre CSI R: 0/10 R: 0/10 R: 4-5/10 R: 0-2/10 0/10   With Use: 2-3/10 post CSI  6-7/10 Post CSI R: 1-2 R: 1-2 R: 6-7/10 R: 6/10 5/10     VAS(0-10) 9/10/2018 9/18/18 9/24/18 10/1/18 10/8/18 10/15/18   At Rest: 0/10 0-2/10 0-2 0-1 0-1 0-1   With Use: 4/10 0-4/10 0-2 0-1 0-2 0-2     Location:  R Medial Elbow/Medial Epicondyle  Description:  Tender sore feeling, bruised  Frequency:  infrequent   Pain is worse: During the day, has improved during the night  Pain is exacerbated by:  Patient transfers/lifting, moving groceries/laundry, wringing out washcloth   Pain is relieved by:  Splint, rest, massage/exercises  Progression since onset: improving    Palpation  Date 4/24/18 5/11/18 5/17/18 6/28/18 8/21/18 9/10/18   Side Right Right Right Right Right Right   MEP NT due to injection Minimal  ++ ++ + to ++   Cubital Tunnel + mild -  - - -   Flexor Wad neg ++ + ++ - -                       Date 9/24/2018 10/1/18 10/8/18 10/15/2018     Side Right Right Right Right     MEP - min + +     Cubital Tunnel -        Flexor Wad + + + +                           Resisted Testing 5/17/18 8/21/18 9/10/18 9/24/18 10/1/18 10/15/18   FCU - + + -     Pronator - + + + + min   Finger flexors - +  + + -                        ULTT 9/10/18 9/24/18 10/1/18 10/8/18    Ulnar ~30% NT NT NT                                          Special Tests:   Tinels over Cubital Tunnel: neg     Home Program:  Avoid underhand lifting, excessive gripping, activities that aggravate pain  OTC Wrist cock up splint for work and tasks that involve lifting/gripping  Tennis elbow strap (over flexors) as prescribed by MD prn  Self massage to FA flexors with ball   Gentle, pain free prolonged FA flexor and extensor stretches  FM to MEP  Eccentric wrist strengthening - on hold due to exacerbated symptoms   Can wean into  more functional tasks being mindful of pain  Spiky ball   Ulnar nerve glide    Next visit:   Ulnar nerve glide   Ionto  STM  Stretches  Future visits: resume eccentrics when pain reduces    Please refer to the daily flowsheet for treatment today, total treatment time and time spent performing 1:1 timed codes.

## 2018-10-19 ENCOUNTER — THERAPY VISIT (OUTPATIENT)
Dept: OCCUPATIONAL THERAPY | Facility: CLINIC | Age: 48
End: 2018-10-19
Payer: COMMERCIAL

## 2018-10-19 DIAGNOSIS — M25.521 RIGHT ELBOW PAIN: ICD-10-CM

## 2018-10-19 DIAGNOSIS — M65.929 TENOSYNOVITIS OF ELBOW: ICD-10-CM

## 2018-10-19 PROCEDURE — 97035 APP MDLTY 1+ULTRASOUND EA 15: CPT | Mod: GO | Performed by: OCCUPATIONAL THERAPIST

## 2018-10-19 PROCEDURE — 97140 MANUAL THERAPY 1/> REGIONS: CPT | Mod: GO | Performed by: OCCUPATIONAL THERAPIST

## 2018-10-19 PROCEDURE — 97033 APP MDLTY 1+IONTPHRSIS EA 15: CPT | Mod: GO | Performed by: OCCUPATIONAL THERAPIST

## 2018-10-22 ENCOUNTER — THERAPY VISIT (OUTPATIENT)
Dept: OCCUPATIONAL THERAPY | Facility: CLINIC | Age: 48
End: 2018-10-22
Payer: COMMERCIAL

## 2018-10-22 DIAGNOSIS — M65.929 TENOSYNOVITIS OF ELBOW: ICD-10-CM

## 2018-10-22 DIAGNOSIS — M25.521 RIGHT ELBOW PAIN: ICD-10-CM

## 2018-10-22 PROCEDURE — 97035 APP MDLTY 1+ULTRASOUND EA 15: CPT | Mod: GO | Performed by: OCCUPATIONAL THERAPIST

## 2018-10-22 PROCEDURE — 97033 APP MDLTY 1+IONTPHRSIS EA 15: CPT | Mod: GO | Performed by: OCCUPATIONAL THERAPIST

## 2018-10-22 PROCEDURE — 97140 MANUAL THERAPY 1/> REGIONS: CPT | Mod: GO | Performed by: OCCUPATIONAL THERAPIST

## 2018-10-22 NOTE — MR AVS SNAPSHOT
After Visit Summary   10/22/2018    Lucero Cedeno    MRN: [id number]           Patient Information     Date Of Birth          1970        Visit Information        Provider Department      10/22/2018 1:30 PM Danette Kunz [facility]        Today's Diagnoses     Tenosynovitis of elbow        Right elbow pain           Follow-ups after your visit        Your next 10 appointments already scheduled     Oct 26, 2018 10:00 AM CDT   CK Hand with Faby Tracy OT   [facility] ([facility])    [address] [address]   [phone]            Oct 29, 2018 10:00 AM CDT   CK Hand with Danette Kunz   [facility]    [address] [address]   [phone]            Nov 01, 2018 10:00 AM CDT   CK Hand with Danette Kunz   [facility] ([facility])    [address] [address]   [phone]              Who to contact     If you have questions or need follow up information about today's clinic visit or your schedule please contact [facility] directly at [phone].  Normal or non-critical lab and imaging results will be communicated to you by MyChart, letter or phone within 4 business days after the clinic has received the results. If you do not hear from us within 7 days, please contact the clinic through MyChart or phone. If you have a critical or abnormal lab result, we will notify you by phone as soon as possible.  Submit refill requests through [employer] or call your pharmacy and they will forward the refill request to us. Please allow 3 business days for your refill to be completed.          Additional Information About Your Visit        Care EveryWhere ID     This is your Care EveryWhere ID. This could be used by other organizations to access your [place] medical records  [id number]         Blood Pressure from Last 3 Encounters:    06/21/11 115/75    Weight from Last 3 Encounters:   06/21/11 71.7 kg (158 lb)              We Performed the Following     ELECTRIC CURRENT THERAPY     MANUAL THER TECH,1+REGIONS,EA 15 MIN     ULTRASOUND THERAPY        Primary Care Provider Office Phone # Fax #    Snehal Solitario [phone]       [address]  [facility] [address]        Equal Access to Services     LUZ MARIA MUHAMMAD : Hadii aad ku hadasho Soomaali, waaxda luqadaha, qaybta kaalmada adeegyada, waxay idiin hayaan adeeg kharash lagregoryn . So [facility] [phone].    ATENCIÓN: Si yarelis chin, tiene a ramesh disposición servicios gratuitos de asistencia lingüística. Llame al [phone].    We comply with applicable federal civil rights laws and [place] laws. We do not discriminate on the basis of race, color, national origin, age, disability, sex, sexual orientation, or gender identity.            Thank you!     Thank you for choosing [facility]  for your care. Our goal is always to provide you with excellent care. Hearing back from our patients is one way we can continue to improve our services. Please take a few minutes to complete the written survey that you may receive in the mail after your visit with us. Thank you!             Your Updated Medication List - Protect others around you: Learn how to safely use, store and throw away your medicines at www.disposemymeds.org.          This list is accurate as of 10/22/18  2:14 PM.  Always use your most recent med list.                   Brand Name Dispense Instructions for use Diagnosis    CALCIUM + D PO      Take  by mouth.        FISH OIL PO      Take  by mouth.        MULTIVITAMIN PO      Take  by mouth.        spironolactone 25 MG tablet    ALDACTONE     Take 25 mg by mouth daily.        venlafaxine 50 MG tablet    EFFEXOR     Take 50 mg by mouth 3 times daily.        vitamin D 400 units capsule      Take 1 capsule by mouth daily.

## 2018-10-22 NOTE — PROGRESS NOTES
SOAP Note Objective Information for 10/22/2018:    Pain Report:  VAS(0-10) 4/24/18 5/11/18 5/17/18 6/28/18 8/21/18 9/4/18   At Rest: 0/10 post CSI  4-5/10 pre CSI R: 0/10 R: 0/10 R: 4-5/10 R: 0-2/10 0/10   With Use: 2-3/10 post CSI  6-7/10 Post CSI R: 1-2 R: 1-2 R: 6-7/10 R: 6/10 5/10     VAS(0-10) 9/10/2018 9/18/18 9/24/18 10/1/18 10/8/18 10/15/18 10/22/18   At Rest: 0/10 0-2/10 0-2 0-1 0-1 0-1 0-1   With Use: 4/10 0-4/10 0-2 0-1 0-2 0-2 0-2     Location:  R Medial Elbow/Medial Epicondyle  Description:  Tender sore feeling, bruised  Frequency:  infrequent   Pain is worse: During the day, has improved during the night  Pain is exacerbated by:  Patient transfers/lifting, moving groceries/laundry, wringing out washcloth   Pain is relieved by:  Splint, rest, massage/exercises  Progression since onset: improving    Palpation  Date 4/24/18 5/11/18 5/17/18 6/28/18 8/21/18 9/10/18   Side Right Right Right Right Right Right   MEP NT due to injection Minimal  ++ ++ + to ++   Cubital Tunnel + mild -  - - -   Flexor Wad neg ++ + ++ - -                       Date 9/24/2018 10/1/18 10/8/18 10/15/2018 10/22/18    Side Right Right Right Right Right    MEP - min + + ++    Cubital Tunnel -        Flexor Wad + + + + +                          Resisted Testing 5/17/18 8/21/18 9/10/18 9/24/18 10/1/18 10/15/18 10/22/18   FCU - + + -      Pronator - + + + + min +   Finger flexors - +  + + -                           ULTT 9/10/18 9/24/18 10/1/18 10/8/18 10/22/18   Ulnar ~30% NT NT NT ~50%                                     Special Tests:   Tinels over Cubital Tunnel: neg     Home Program:  Avoid underhand lifting, excessive gripping, activities that aggravate pain  OTC Wrist cock up splint for work and tasks that involve lifting/gripping  Tennis elbow strap (over flexors) as prescribed by MD prn  Self massage to FA flexors with ball   Gentle, pain free prolonged FA flexor and extensor stretches  FM to MEP  Eccentric wrist strengthening -  on hold due to exacerbated symptoms   Can wean into more functional tasks being mindful of pain  Spiky ball   Ulnar nerve glide    Next visit:   Ulnar nerve glide   Ionto  STM  Stretches  Future visits: resume eccentrics when pain reduces

## 2018-10-26 ENCOUNTER — THERAPY VISIT (OUTPATIENT)
Dept: OCCUPATIONAL THERAPY | Facility: CLINIC | Age: 48
End: 2018-10-26
Payer: COMMERCIAL

## 2018-10-26 DIAGNOSIS — M25.521 RIGHT ELBOW PAIN: ICD-10-CM

## 2018-10-26 DIAGNOSIS — M65.929 TENOSYNOVITIS OF ELBOW: ICD-10-CM

## 2018-10-26 PROCEDURE — 97140 MANUAL THERAPY 1/> REGIONS: CPT | Mod: GO | Performed by: OCCUPATIONAL THERAPIST

## 2018-10-26 PROCEDURE — 97033 APP MDLTY 1+IONTPHRSIS EA 15: CPT | Mod: GO | Performed by: OCCUPATIONAL THERAPIST

## 2018-10-26 NOTE — PROGRESS NOTES
Progress Note - Hand Therapy  Current Date:  10/26/2018    Diagnosis:  R Elbow Pain (Medial Epicondylitis)  DOI: March 2018  Post:  5 months    Number of visits to date:  24    Reporting period is 8/21/2018 through 10/26/2018    Upper Extremity Functional Index Score:  SCORE:   Column Totals: /80: 66   (A lower score indicates greater disability.)    Subjectve:   Subjective changes as noted by patient: Things are feeling better!    Functional changes noted by patient: Improvement in Self Care Tasks (dressing, eating, bathing, hygiene/toileting), Work Tasks and Household Chores  Response to previous treatment: good  Patient has noted adverse reaction to: None    Objective:  Pain Report:  VAS(0-10) 4/24/18 5/11/18 5/17/18 6/28/18 8/21/18 9/4/18   At Rest: 0/10 post CSI  4-5/10 pre CSI R: 0/10 R: 0/10 R: 4-5/10 R: 0-2/10 0/10   With Use: 2-3/10 post CSI  6-7/10 Post CSI R: 1-2 R: 1-2 R: 6-7/10 R: 6/10 5/10     VAS(0-10) 9/10/2018 9/18/18 9/24/18 10/8/18 10/15/18 10/22/18 10/26/18   At Rest: 0/10 0-2/10 0-2 0-1 0-1 0-1 0-3   With Use: 4/10 0-4/10 0-2 0-2 0-2 0-2 5 at worst     Location:  R Medial Elbow/Medial Epicondyle  Description:  Tender sore feeling, bruised   Frequency:  infrequent   Pain is worse: During the day, has improved during the night  Pain is exacerbated by:  Squeezing and chopping  Pain is relieved by:  Splint, rest, massage/exercises  Progression since onset: improving     Palpation  Date 4/24/18 5/11/18 5/17/18 6/28/18 8/21/18 9/10/18   Side Right Right Right Right Right Right   MEP NT due to injection Minimal  ++ ++ + to ++   Cubital Tunnel + mild -  - - -   Flexor Wad neg ++ + ++ - -                       Date 9/24/2018 10/1/18 10/8/18 10/15/2018 10/22/18 10/26/18   Side Right Right Right Right Right Right   MEP - min + + ++ ++   Cubital Tunnel -        Flexor Wad + + + + + +                         Resisted Testing 5/17/18 8/21/18 9/10/18 9/24/18 10/1/18 10/15/18 10/22/18 10/26/18   FCU - + + -     -   Pronator - + + + + min + +   Finger flexors - +  + + -   ++                           ULTT 9/10/18 9/24/18 10/1/18 10/8/18 10/22/18   Ulnar ~30% NT NT NT ~50%                                     ROM: Elbow AROM (PROM)  4/24/18 Date: 4/24/18   Left Side: Right   5 Extension 5   140 Flexion 140   90 Supination 90   80 Pronation 80     ROM: Wrist AROM (PROM)  4/24 Date: 4/24 5/18 8/21 10/26   Left Side: Right Right Right Right   75 Extension 67 75 75 75 wnl   80 Flexion 75 80 77 80 wnl   25 RD 25      40 UD 37         and Pinch Strength (pounds)  8/21 Date: 8/21 10/26   Left    Side  Right Right   82        # 1                # 2                # 3         Average 30 to pain 70-   22  3 Point  # 1               # 2               # 3        Average 16- 17-   20  Lateral  # 1                # 2                # 3         Average 13+ 17-     Edema:  [x]        None  []         Mild   []        Moderate  []         Severe     []         of affected part    Scar/Wound:  None    Sensation: [x]         WNL throughout all nerve distributions; per patient report    Special Tests:   Tinels over Cubital Tunnel: neg     Assessment:  Response to therapy has been improvement to:  Pain:  frequency is less, intensity of pain is decreased, duration of pain is decreased and less tender over affected area  Overall Assessment:  Patient would benefit from continued therapy to achieve rehab potential  STG/LTG:  See goal sheet for details and updates of remaining functional limitations.     Plan:  I have re-evaluated this patient and find that the nature, scope, duration and intensity of the therapy is appropriate for the medical condition of the patient.  Frequency/Duration:  Recommend continuing to see patient  2 X week, once daily  for 6-8 additional visits to continue iontophoresis for POC    Home Program:  Avoid underhand lifting, excessive gripping, activities that aggravate pain  OTC Wrist cock up splint for work and tasks  that involve lifting/gripping  Tennis elbow strap (over flexors) as prescribed by MD prn  Self massage to FA flexors with ball   Gentle, pain free prolonged FA flexor and extensor stretches  FM to MEP  Eccentric wrist strengthening - on hold due to exacerbated symptoms   Can wean into more functional tasks being mindful of pain  Spiky ball   Ulnar nerve glide    Next visit:   US  Ionto  STM/FM  Stretches    Please refer to the daily flowsheet for treatment today, total treatment time and time spent performing 1:1 timed codes.

## 2018-10-26 NOTE — LETTER
[facility]  [address] [address]  [phone]    2018    Re: Lucero Cedeno   :   1970  MRN:  [id number]   REFERRING PHYSICIAN:   Pavithra Knutson    [facility]    Date of Initial Evaluation:  2018  Visits: 25  Rxs Used: 25  Reason for Referral:     Tenosynovitis of elbow  Right elbow pain    Progress Note - Hand Therapy    Current Date:  10/26/2018  Diagnosis:  R Elbow Pain (Medial Epicondylitis)  DOI: 2018  Post:  5 months  Number of visits to date:  24  Reporting period is 2018 through 10/26/2018    Upper Extremity Functional Index Score:  SCORE:   Column Totals: /80: 66   (A lower score indicates greater disability.)    Subjectve:   Subjective changes as noted by patient: Things are feeling better!    Functional changes noted by patient: Improvement in Self Care Tasks (dressing, eating, bathing, hygiene/toileting), Work Tasks and Household Chores  Response to previous treatment: good  Patient has noted adverse reaction to: None    Objective:  Pain Report:  VAS(0-10) 18   At Rest: 0/10 post CSI  4-5/10 pre CSI R: 0/10 R: 0/10 R: 4-5/10 R: 0-2/10 0/10   With Use: 2-3/10 post CSI  6-7/10 Post CSI R: 1-2 R: 1-2 R: 6-7/10 R: 6/10 5/10     VAS(0-10) 9/10/2018 9/18/18 9/24/18 10/8/18 10/15/18 10/22/18 10/26/18   At Rest: 0/10 0-2/10 0-2 0-1 0-1 0-1 0-3   With Use: 4/10 0-4/10 0-2 0-2 0-2 0-2 5 at worst     Re: Lucero Cedeno   :   1970    Location:  R Medial Elbow/Medial Epicondyle  Description:  Tender sore feeling, bruised   Frequency:  infrequent   Pain is worse: During the day, has improved during the night  Pain is exacerbated by:  Squeezing and chopping  Pain is relieved by:  Splint, rest, massage/exercises  Progression since onset: improving     Palpation  Date 4/24/18 5/11/18 5/17/18 6/28/18 8/21/18 9/10/18   Side Right Right Right Right Right Right   MEP NT due to injection  Minimal  ++ ++ + to ++   Cubital Tunnel + mild -  - - -   Flexor Wad neg ++ + ++ - -                       Date 2018 10/1/18 10/8/18 10/15/2018 10/22/18 10/26/18   Side Right Right Right Right Right Right   MEP - min + + ++ ++   Cubital Tunnel -        Flexor Wad + + + + + +                       Resisted Testing 5/17/18 8/21/18 9/10/18 9/24/18 10/1/18 10/15/18 10/22/18 10/26/18   FCU - + + -    -   Pronator - + + + + min + +   Finger flexors - +  + + -   ++                           ULTT 9/10/18 9/24/18 10/1/18 10/8/18 10/22/18   Ulnar ~30% NT NT NT ~50%                                     ROM: Elbow AROM (PROM)  18 Date: 18   Left Side: Right   5 Extension 5   140 Flexion 140   90 Supination 90   80 Pronation 80       Re: Lucero Cedeno   :   1970    ROM: Wrist AROM (PROM)   Date: 4/24 5/18 8/21 10/26   Left Side: Right Right Right Right   75 Extension 67 75 75 75 wnl   80 Flexion 75 80 77 80 wnl   25 RD 25      40 UD 37         and Pinch Strength (pounds)   Date: 8/21 10/26   Left    Side  Right Right   82        # 1                # 2                # 3         Average 30 to pain 70-   22  3 Point  # 1               # 2               # 3        Average 16- 17-   20  Lateral  # 1                # 2                # 3         Average 13+ 17-     Edema:  [x]        None  []         Mild   []        Moderate  []         Severe     []         of affected part    Scar/Wound:  None    Sensation: [x]         WNL throughout all nerve distributions; per patient report    Special Tests:   Tinels over Cubital Tunnel: neg     Assessment:  Response to therapy has been improvement to:  Pain:  frequency is less, intensity of pain is decreased, duration of pain is decreased and less tender over affected area  Overall Assessment:  Patient would benefit from continued therapy to achieve rehab potential  STG/LTG:  See goal sheet for details and updates of remaining functional limitations.      Plan:  I have re-evaluated this patient and find that the nature, scope, duration and intensity of the therapy is appropriate for the medical condition of the patient.  Frequency/Duration:  Recommend continuing to see patient  2 X week, once daily  for 6-8 additional visits to continue iontophoresis for POC  Re: Lucero Cedeno   :   1970    Home Program:  Avoid underhand lifting, excessive gripping, activities that aggravate pain  OTC Wrist cock up splint for work and tasks that involve lifting/gripping  Tennis elbow strap (over flexors) as prescribed by MD prn  Self massage to FA flexors with ball   Gentle, pain free prolonged FA flexor and extensor stretches  FM to MEP  Eccentric wrist strengthening - on hold due to exacerbated symptoms   Can wean into more functional tasks being mindful of pain  Spiky ball   Ulnar nerve glide    Next visit:   US  Ionto  STM/FM  Stretches            Thank you for your referral.    INQUIRIES  Therapist: Faby Tracy OT  [facility]  [address] [place] [address]  Phone: [phone]  Fax: [phone]

## 2018-10-29 ENCOUNTER — THERAPY VISIT (OUTPATIENT)
Dept: OCCUPATIONAL THERAPY | Facility: CLINIC | Age: 48
End: 2018-10-29
Payer: COMMERCIAL

## 2018-10-29 DIAGNOSIS — M65.929 TENOSYNOVITIS OF ELBOW: ICD-10-CM

## 2018-10-29 DIAGNOSIS — M25.521 RIGHT ELBOW PAIN: ICD-10-CM

## 2018-10-29 PROCEDURE — 97035 APP MDLTY 1+ULTRASOUND EA 15: CPT | Mod: GO | Performed by: OCCUPATIONAL THERAPIST

## 2018-10-29 PROCEDURE — 97033 APP MDLTY 1+IONTPHRSIS EA 15: CPT | Mod: GO | Performed by: OCCUPATIONAL THERAPIST

## 2018-10-29 PROCEDURE — 97112 NEUROMUSCULAR REEDUCATION: CPT | Mod: GO | Performed by: OCCUPATIONAL THERAPIST

## 2018-10-29 PROCEDURE — 97140 MANUAL THERAPY 1/> REGIONS: CPT | Mod: GO | Performed by: OCCUPATIONAL THERAPIST

## 2018-10-29 NOTE — MR AVS SNAPSHOT
After Visit Summary   10/29/2018    Lucero Cedeno    MRN: [id number]           Patient Information     Date Of Birth          1970        Visit Information        Provider Department      10/29/2018 10:00 AM [facility]        Today's Diagnoses     Tenosynovitis of elbow        Right elbow pain           Follow-ups after your visit        Your next 10 appointments already scheduled     Oct 31, 2018 10:30 AM CDT   (Arrive by 10:15 AM)   CK Chiropractor with Melina Hernández DC   [facility] ([facility] [place])    [address] [place] [address]  [address]  [place] [address]   [phone]            Nov 01, 2018 10:00 AM CDT   CK Hand with Danette Kunz   [facility]    [address] [facility] [address]  [place] [address]   [phone]            Nov 05, 2018  8:30 AM CST   CK Hand with Danette Kunz   [facility] ([facility])    [address] [facility] [address]  [place] [address]   [phone]            Nov 12, 2018  8:00 AM CST   CK Hand with Danette Kunz   [facility] ([facility])    [address] [place] [address] [address]  [place] [address]   [phone]            Nov 12, 2018  8:45 AM CST   CK Chiropractor with Melina Hernández DC   [facility] [place] [facility] ([facility] [place]  )    [address] [place] [address]. [address]  [place] [address]   [phone]            Nov 16, 2018  9:00 AM CST   CK Hand with Faby Tracy OT   [facility] ([facility])    [address] [facility] [address]  [place] [address]   [phone]              Who to contact     If you have questions or need follow up information about today's clinic visit or your schedule please contact [facility] directly at [phone].  Normal or non-critical lab and imaging results will be communicated to you by MyChart, letter or phone within 4 business days after the clinic has  received the results. If you do not hear from us within 7 days, please contact the clinic through [employer] or phone. If you have a critical or abnormal lab result, we will notify you by phone as soon as possible.  Submit refill requests through [employer] or call your pharmacy and they will forward the refill request to us. Please allow 3 business days for your refill to be completed.          Additional Information About Your Visit        Care EveryWhere ID     This is your Care EveryWhere ID. This could be used by other organizations to access your [place] medical records  [id number]         Blood Pressure from Last 3 Encounters:   06/21/11 115/75    Weight from Last 3 Encounters:   06/21/11 71.7 kg (158 lb)              We Performed the Following     ELECTRIC CURRENT THERAPY     MANUAL THER TECH,1+REGIONS,EA 15 MIN     NEUROMUSCULAR RE-EDUCATION     ULTRASOUND THERAPY        Primary Care Provider Office Phone # Fax #    Snheal Solitario [phone]       [address] RANDLE [address] [place] [address]        Equal Access to Services     TEJAS MUHAMMAD : Hadii aad ku hadasho Soomaali, waaxda luqadaha, qaybta kaalmada adeegyada, waxay idiin haybertrand montez . So [facility] [phone].    ATENCIÓN: Si habla español, tiene a ramesh disposición servicios gratuitos de asistencia lingüística. [facility] [phone].    We comply with applicable federal civil rights laws and [place] laws. We do not discriminate on the basis of race, color, national origin, age, disability, sex, sexual orientation, or gender identity.            Thank you!     Thank you for choosing [facility]  for your care. Our goal is always to provide you with excellent care. Hearing back from our patients is one way we can continue to improve our services. Please take a few minutes to complete the written survey that you may receive in the mail after your visit with us. Thank you!             Your Updated  Medication List - Protect others around you: Learn how to safely use, store and throw away your medicines at www.disposemymeds.org.          This list is accurate as of 10/29/18 12:40 PM.  Always use your most recent med list.                   Brand Name Dispense Instructions for use Diagnosis    CALCIUM + D PO      Take  by mouth.        FISH OIL PO      Take  by mouth.        MULTIVITAMIN PO      Take  by mouth.        spironolactone 25 MG tablet    ALDACTONE     Take 25 mg by mouth daily.        venlafaxine 50 MG tablet    EFFEXOR     Take 50 mg by mouth 3 times daily.        vitamin D 400 units capsule      Take 1 capsule by mouth daily.

## 2018-10-31 ENCOUNTER — THERAPY VISIT (OUTPATIENT)
Dept: CHIROPRACTIC MEDICINE | Facility: CLINIC | Age: 48
End: 2018-10-31
Payer: COMMERCIAL

## 2018-10-31 DIAGNOSIS — M99.07 SOMATIC DYSFUNCTION OF UPPER EXTREMITIES: Primary | ICD-10-CM

## 2018-10-31 DIAGNOSIS — M25.521 RIGHT ELBOW PAIN: ICD-10-CM

## 2018-10-31 DIAGNOSIS — M77.01 MEDIAL EPICONDYLITIS OF ELBOW, RIGHT: ICD-10-CM

## 2018-10-31 PROCEDURE — 97810 ACUP 1/> WO ESTIM 1ST 15 MIN: CPT | Mod: GA | Performed by: CHIROPRACTOR

## 2018-10-31 PROCEDURE — 99202 OFFICE O/P NEW SF 15 MIN: CPT | Mod: 25 | Performed by: CHIROPRACTOR

## 2018-10-31 PROCEDURE — 98943 CHIROPRACT MANJ XTRSPINL 1/>: CPT | Mod: 51 | Performed by: CHIROPRACTOR

## 2018-11-01 ENCOUNTER — THERAPY VISIT (OUTPATIENT)
Dept: OCCUPATIONAL THERAPY | Facility: CLINIC | Age: 48
End: 2018-11-01
Payer: COMMERCIAL

## 2018-11-01 DIAGNOSIS — M25.521 RIGHT ELBOW PAIN: ICD-10-CM

## 2018-11-01 DIAGNOSIS — M65.929 TENOSYNOVITIS OF ELBOW: ICD-10-CM

## 2018-11-01 PROBLEM — M99.07 SOMATIC DYSFUNCTION OF UPPER EXTREMITIES: Status: ACTIVE | Noted: 2018-11-01

## 2018-11-01 PROBLEM — M77.01 MEDIAL EPICONDYLITIS OF ELBOW, RIGHT: Status: ACTIVE | Noted: 2018-11-01

## 2018-11-01 PROCEDURE — 97035 APP MDLTY 1+ULTRASOUND EA 15: CPT | Mod: GO | Performed by: OCCUPATIONAL THERAPIST

## 2018-11-01 PROCEDURE — 97033 APP MDLTY 1+IONTPHRSIS EA 15: CPT | Mod: GO | Performed by: OCCUPATIONAL THERAPIST

## 2018-11-01 PROCEDURE — 97140 MANUAL THERAPY 1/> REGIONS: CPT | Mod: GO | Performed by: OCCUPATIONAL THERAPIST

## 2018-11-01 NOTE — PROGRESS NOTES
Chiropractic Clinic Visit    PCP: Snehal Solitario    Lucero Cedeno is a 48 year old female who is seen  in consultation at the request of Danette Kunz OT presenting with chronic medial elbow pain/tendinitis  . Patient reports that the onset was February 2018.  When asked, patient denies:, falling, slipping, bending and reaching or sleeping awkwardly. The pt reports overusing the R arm when keyboarding and weeding her yard. She has been in OT for hand therapy with good results. She continues to reports pain in the R medial elbow when performing fine motions and repetitious activities. The pt grades the px a 3/10 on VAS. She reports mid back pain as well from extended sitting.  Prior to onset, the patient was able to perform computer work. Patient notes that due to symptoms, they can only only perform limited computer work. Lucero Cedeno notes   computer work 3/10 painful and prior to this incident it was 0/10.    Injury: There was no injury related to this episode     Location of Pain: right medial elbow  at the following level(s) T4  and T6   Duration of Pain: 8 months    Rating of Pain at worst: 4/10  Rating of Pain Currently: 3/10  Symptoms are better with: some OT   Symptoms are worse with: computer work   Additional Features: none        Health History  as reported by the patient:    How does the patient rate their own health:   Good    Current or past medical history:   Concussion/Dizziness and Migraines/headaches    Medical allergies  Other: thimerosal     Past Traumas/Surgeries  Other:  R knee, tonsillectomy, endometrioses surgery     Family History  No family history on file.    Medications:  Anti-inflammatory and other:  Sudafed Maxalt     Occupation:  RN    Primary job tasks:   Computer work, Prolonged sitting, Prolonged standing and Repetitive tasks    Barriers as home/work:   none    Additional health Issues:     None       Review of Systems  Musculoskeletal: as above  Remainder of review of systems is  negative including constitutional, CV, pulmonary, GI, Skin and Neurologic except as noted in HPI or medical history.    Past Medical History:   Diagnosis Date     Depressive disorder      No past surgical history on file.    Objective  There were no vitals taken for this visit.    GENERAL APPEARANCE: healthy, alert and no distress   GAIT: NORMAL  SKIN: no suspicious lesions or rashes  NEURO: Normal strength and tone, mentation intact and speech normal  PSYCH:  mentation appears normal and affect normal/bright    Lucero was asked to complete the Neck Disability Index, today in the office. NDI Disability score: pending%; pain severity scale: 3 /10..    Cervical Spine Exam    Range of Motion:         Full active and passive ROM forward flexion, extension, lateral rotation, lateral flexion.    Inspection:         No visible deformity        normal lordotic curvature maintained  Anterior neck carriage, slumped seated posture, poor posture, increased kyphosis      Tender:        R levator scapula     Non-Tender:        remainder of cervical spine area    Muscle strength:       C4 (shoulder shrug)  symmetric 5/5 Normal       C5 (shoulder abduction) symmetric 5/5 Normal       C6 (elbow flexion) symmetric 5/5 Normal       C7 (elbow extension) symmetric 5/5 Normal       C8 (finger abduction, thumb flexion) symmetric 5/5 Normal    Reflexes:        C5 (biceps) symmetric 2 bilaterally       C6 (supinator) symmetric 2 bilaterally       C7 (triceps) symmetric 2 bilaterally    Sensation:       grossly intact througout bilateral upper extremities    Special Tests:    R elbow examination  Decreased internal R shoulder motion  Tenderness to palpation pronator teres, brachialis, ulnaris     Lymphatics:        no edema noted in the upper extremities       Segmental spinal dysfunction/restrictions found at:  T5, T9      The following soft tissue hypotonicities were observed:T paraspinals: ache and dull pain, no  R pronator teres : ache  and dull pain, referred pain: no  R ulnaris   R forearm flexors  R radial head     Trigger points were found in:none     Muscle spasm found in:T-spine paraspinal and R medial epicondyle, R pronator :       Radiology:  None     Assessment:    No diagnosis found.    RX ordered/plan of care  Anticipated outcomes  Possible risks and side effects    After discussing the risk and benefits of care, patient consented to treatment    Patient's condition:  Patient had restrictions pre-manipulation    Treatment effectiveness:  Post manipulation there is better intersegmental movement and Patient claims to feel looser post manipulation    Plan:    Procedures:    Evaluation and Management:  37295 Low to moderate level exam 20 min    CMT:  37592 Chiropractic manipulative treatment 1-2 regions performed   Thoracic: Diversified, T5, T9, Prone    Modalities:  22554: Acupuncture, for 15 minutes:  Points: Ahsi point in R elbow and R forearm  For 15 minutes    Therapeutic procedures:  None    Response to Treatment    No increase in sx, pt stable     Prognosis: good       Treatment plan and goals:  Goals:  USING A COMPUTER: the patient specific goals is to attain his pre-injury status of 4-6 hours    Frequency of care  Duration of care is estimated to be 6-8 weeks, from the initial treatment.  It is estimated that the patient will need a total of 6-8 visits to resolve this episode.  For the initial therapeutic trial of care, the frequency is recommended at 1 X week, once daily.  A reevaluation would be clinically appropriate in 6-8 visits, to determine progress and further course of care.    In-Office Treatment  Evaluation  Spinal Chiropractic Manipulative Therapy  Acupuncture      Recommendations:    Instructions:expect soreness     Follow-up:  Return to care in 1 week with ACP.     Disclaimer: This note consists of symbols derived from keyboarding, dictation and/or voice recognition software. As a result, there may be errors in the  script that have gone undetected. Please consider this when interpreting information found in this chart.

## 2018-11-05 ENCOUNTER — THERAPY VISIT (OUTPATIENT)
Dept: OCCUPATIONAL THERAPY | Facility: CLINIC | Age: 48
End: 2018-11-05
Payer: COMMERCIAL

## 2018-11-05 DIAGNOSIS — M25.521 RIGHT ELBOW PAIN: ICD-10-CM

## 2018-11-05 DIAGNOSIS — M65.929 TENOSYNOVITIS OF ELBOW: ICD-10-CM

## 2018-11-05 PROCEDURE — 97110 THERAPEUTIC EXERCISES: CPT | Mod: GO | Performed by: OCCUPATIONAL THERAPIST

## 2018-11-05 NOTE — PROGRESS NOTES
SOAP note objective information for 11/5/2018    Please refer to the daily flowsheet for treatment today, total treatment time and time spent performing 1:1 timed codes.         Objective:  Pain Report:  VAS(0-10) 4/24/18 5/11/18 5/17/18 6/28/18 8/21/18 9/4/18   At Rest: 0/10 post CSI  4-5/10 pre CSI R: 0/10 R: 0/10 R: 4-5/10 R: 0-2/10 0/10   With Use: 2-3/10 post CSI  6-7/10 Post CSI R: 1-2 R: 1-2 R: 6-7/10 R: 6/10 5/10     VAS(0-10) 9/10/2018 9/18/18 9/24/18 10/8/18 10/15/18 10/22/18 10/26/18   At Rest: 0/10 0-2/10 0-2 0-1 0-1 0-1 0-3   With Use: 4/10 0-4/10 0-2 0-2 0-2 0-2 5 at worst     VAS(0-10) 10/29/18 11/5/18        At Rest: 0 0        With Use: 3 3            Location:  R Medial Elbow/Medial Epicondyle  Description:  Tender sore feeling, bruised   Frequency:  infrequent   Pain is worse: During the day, has improved during the night  Pain is exacerbated by:  Squeezing and chopping  Pain is relieved by:  Splint, rest, massage/exercises  Progression since onset: improving     Palpation  Date 4/24/18 5/11/18 5/17/18 6/28/18 8/21/18 9/10/18   Side Right Right Right Right Right Right   MEP NT due to injection Minimal  ++ ++ + to ++   Cubital Tunnel + mild -  - - -   Flexor Wad neg ++ + ++ - -                       Date 9/24/2018 10/1/18 10/8/18 10/15/2018 10/22/18 10/26/18 10/29/18   Side Right Right Right Right Right Right Right   MEP - min + + ++ ++ ++   Cubital Tunnel -         Flexor Wad + + + + + + +                         Date 11/5/2018         Side Right Right Right Right Right Right Right   MEP -         Cubital Tunnel -         Flexor Wad +                                   Resisted Testing 5/17/18 8/21/18 9/10/18 9/24/18 10/1/18 10/15/18 10/22/18 10/26/18 10/29/18   FCU - + + -    -    Pronator - + + + + min + + +   Finger flexors - +  + + -   ++ ++                             Resisted Testing 11/5/2018           FCU +           Pronator +           Finger flexors                                         ULTT 9/10/18 9/24/18 10/1/18 10/8/18 10/22/18 10/29/18 11/5/2018   Ulnar ~30% NT NT NT ~50% ~50% ~60%                                             ROM: Elbow AROM (PROM)  4/24/18 Date: 4/24/18   Left Side: Right   5 Extension 5   140 Flexion 140   90 Supination 90   80 Pronation 80     ROM: Wrist AROM (PROM)  4/24 Date: 4/24 5/18 8/21 10/26   Left Side: Right Right Right Right   75 Extension 67 75 75 75 wnl   80 Flexion 75 80 77 80 wnl   25 RD 25      40 UD 37         and Pinch Strength (pounds)  8/21 Date: 8/21 10/26   Left    Side  Right Right   82        # 1                # 2                # 3         Average 30 to pain 70-   22  3 Point  # 1               # 2               # 3        Average 16- 17-   20  Lateral  # 1                # 2                # 3         Average 13+ 17-     Edema:  [x]        None  []         Mild   []        Moderate  []         Severe     []         of affected part    Scar/Wound:  None    Sensation: [x]         WNL throughout all nerve distributions; per patient report    Special Tests:   Tinels over Cubital Tunnel: neg       Home Program:  Avoid underhand lifting, excessive gripping, activities that aggravate pain  OTC Wrist cock up splint for work and tasks that involve lifting/gripping  Tennis elbow strap (over flexors) as prescribed by MD prn  Self massage to FA flexors with ball   Gentle, pain free prolonged FA flexor and extensor stretches  FM to MEP  Eccentric wrist strengthening - on hold due to exacerbated symptoms   Can wean into more functional tasks being mindful of pain  Spiky ball   Ulnar nerve glide  Foam roller    Next visit:   Finish instructing in foam roller  US  Ionto  STM/FM  Stretches    Please refer to the daily flowsheet for treatment today, total treatment time and time spent performing 1:1 timed codes.

## 2018-11-05 NOTE — MR AVS SNAPSHOT
After Visit Summary   11/5/2018    Lucero Cedeno    MRN: [id number]           Patient Information     Date Of Birth          1970        Visit Information        Provider Department      11/5/2018 8:30 AM Danette Kunz [facility] [place]        Today's Diagnoses     Tenosynovitis of elbow        Right elbow pain           Follow-ups after your visit        Your next 10 appointments already scheduled     Nov 07, 2018  9:00 AM CST   Chiro Acupuncture Follow Up with Melina Hernández DC   [facility] [place] [facility] ([facility] [place])    [address] [place] [address] Drive  [address]  [place] [address]   [phone]            Nov 12, 2018  8:00 AM CST   CK Hand with Danette Kunz   [facility] [place] ([facility])    [address] [place] [address] [address]  [place] [address]   [phone]            Nov 12, 2018  8:45 AM CST   Chiro Acupuncture Follow Up with Melina Hernández DC   [facility] ([facility] [place]  )    [address] [place] [address]. [address]  [place] [address]   [phone]            Nov 16, 2018  9:00 AM CST   CK Hand with Faby Tracy OT   [facility] ([facility] [facility])    [address] [place] [address] [address]  [place] [address] [phone]            Nov 19, 2018 10:00 AM CST   Chiro Acupuncture Follow Up with JENNIFER [facility] [place] [facility] ([facility] [place]  )    [address] [place] [address]. [address]  [place] [address] [phone]              Who to contact     If you have questions or need follow up information about today's clinic visit or your schedule please contact [facility] directly at [phone].  Normal or non-critical lab and imaging results will be communicated to you by MyChart, letter or phone within 4 business days after the clinic has received the results. If you do not hear from us within 7 days, please contact the clinic through MyChart or phone. If you have a critical or abnormal lab result, we will  notify you by phone as soon as possible.  Submit refill requests through [employer] or call your pharmacy and they will forward the refill request to us. Please allow 3 business days for your refill to be completed.          Additional Information About Your Visit        Care EveryWhere ID     This is your Care EveryWhere ID. This could be used by other organizations to access your [place] medical records  [id number]         Blood Pressure from Last 3 Encounters:   06/21/11 115/75    Weight from Last 3 Encounters:   06/21/11 71.7 kg (158 lb)              We Performed the Following     THERAPEUTIC EXERCISES        Primary Care Provider Office Phone # Fax #    Snehal Solitario [phone]       [address] RANDLE [address] [place] [address]        Equal Access to Services     TEJAS MUHAMMAD : Hadii susan ku hadasho Soomaali, waaxda luqadaha, qaybta kaalmada adeegyada, waxay hali montez . So [facility] [phone].    ATENCIÓN: Si habla español, tiene a ramesh disposición servicios gratuitos de asistencia lingüística. [facility] [phone].    We comply with applicable federal civil rights laws and [place] laws. We do not discriminate on the basis of race, color, national origin, age, disability, sex, sexual orientation, or gender identity.            Thank you!     Thank you for choosing [facility]  for your care. Our goal is always to provide you with excellent care. Hearing back from our patients is one way we can continue to improve our services. Please take a few minutes to complete the written survey that you may receive in the mail after your visit with us. Thank you!             Your Updated Medication List - Protect others around you: Learn how to safely use, store and throw away your medicines at www.disposemymeds.org.          This list is accurate as of 11/5/18  9:23 AM.  Always use your most recent med list.                   Brand Name Dispense  Instructions for use Diagnosis    CALCIUM + D PO      Take  by mouth.        FISH OIL PO      Take  by mouth.        MULTIVITAMIN PO      Take  by mouth.        spironolactone 25 MG tablet    ALDACTONE     Take 25 mg by mouth daily.        venlafaxine 50 MG tablet    EFFEXOR     Take 50 mg by mouth 3 times daily.        vitamin D 400 units capsule      Take 1 capsule by mouth daily.

## 2018-11-07 ENCOUNTER — THERAPY VISIT (OUTPATIENT)
Dept: CHIROPRACTIC MEDICINE | Facility: CLINIC | Age: 48
End: 2018-11-07
Payer: COMMERCIAL

## 2018-11-07 DIAGNOSIS — M77.01 MEDIAL EPICONDYLITIS OF ELBOW, RIGHT: ICD-10-CM

## 2018-11-07 DIAGNOSIS — M25.521 RIGHT ELBOW PAIN: ICD-10-CM

## 2018-11-07 DIAGNOSIS — M99.07 SOMATIC DYSFUNCTION OF UPPER EXTREMITIES: Primary | ICD-10-CM

## 2018-11-07 PROCEDURE — 97810 ACUP 1/> WO ESTIM 1ST 15 MIN: CPT | Mod: GA | Performed by: CHIROPRACTOR

## 2018-11-07 PROCEDURE — 98943 CHIROPRACT MANJ XTRSPINL 1/>: CPT | Mod: 51 | Performed by: CHIROPRACTOR

## 2018-11-07 NOTE — PROGRESS NOTES
Visit #:  2    Subjective:  Lucero Cedeno is a 48 year old female who is seen in f/u up for:        Somatic dysfunction of upper extremities  Medial epicondylitis of elbow, right  Right elbow pain.     Since last visit on 10/31/2018,  Lucero Cedeno reports:    Area of chief complaint:  Extra-spinal :  Symptoms are graded at 4/10. The quality is described as stiff, achey, dull.  Motion has increased, but is still not normal, The pt reports a slight improvement after the initial treatment session. She denies pain post treatment of ACP. The px is medial. She has been keyboarding extensively over the weekend. The pt denies weakness or other unusual sx. Patient feels that they are improved due to a reduction in symptoms.     Since last visit the patient feels that they are 10 percent  improved from last visit.       Objective:  The following was observed:    P: palpatory tendernessR ulnaris, R pronator teres:  R>>L  A: static palpation demonstrates intersegmental asymmetry   R: motion palpation notes restricted motion  T: hypertonicity at: pronator teres, R ulnaris, R brachialis :  R>>L    Segmental spinal dysfunction/restrictions found at:  T5, R radial head, R medial epicondyle hypomobility .      Assessment:    Diagnoses:      1. Somatic dysfunction of upper extremities    2. Medial epicondylitis of elbow, right    3. Right elbow pain        Patient's condition:  Patient had restrictions pre-manipulation    Treatment effectiveness:  Post manipulation there is better intersegmental movement and Patient claims to feel looser post manipulation      Procedures:  CMT:  28519 Chiropractic manipulative treatment extraspinal dysfunction/restriction  Extra-spinal: Activator, R radial head with LAD of the ulna and radialis , Seated    Modalities:  06973: Acupuncture, for 15 minutes:  Points: Ahsi point in R medial elbow and forearm  For 15 minutes    Therapeutic procedures:  None    Response to Treatment  No increase in sx pt  stable     Prognosis: Good    Progress towards Goals: Patient is making progress towards the goal.   Goals:  USING A COMPUTER: the patient specific goals is to attain his pre-injury status of 4-6 hours      Recommendations:    Instructions:expect soreness, FU with OT     Follow-up:  Return to care in 1 week with ACP.

## 2018-11-12 ENCOUNTER — THERAPY VISIT (OUTPATIENT)
Dept: OCCUPATIONAL THERAPY | Facility: CLINIC | Age: 48
End: 2018-11-12
Payer: COMMERCIAL

## 2018-11-12 ENCOUNTER — THERAPY VISIT (OUTPATIENT)
Dept: CHIROPRACTIC MEDICINE | Facility: CLINIC | Age: 48
End: 2018-11-12
Payer: COMMERCIAL

## 2018-11-12 DIAGNOSIS — M65.929 TENOSYNOVITIS OF ELBOW: ICD-10-CM

## 2018-11-12 DIAGNOSIS — M99.07 SOMATIC DYSFUNCTION OF UPPER EXTREMITIES: Primary | ICD-10-CM

## 2018-11-12 DIAGNOSIS — M25.521 RIGHT ELBOW PAIN: ICD-10-CM

## 2018-11-12 DIAGNOSIS — M77.01 MEDIAL EPICONDYLITIS OF ELBOW, RIGHT: ICD-10-CM

## 2018-11-12 PROCEDURE — 97140 MANUAL THERAPY 1/> REGIONS: CPT | Mod: GO | Performed by: OCCUPATIONAL THERAPIST

## 2018-11-12 PROCEDURE — 97112 NEUROMUSCULAR REEDUCATION: CPT | Mod: GO | Performed by: OCCUPATIONAL THERAPIST

## 2018-11-12 PROCEDURE — 97810 ACUP 1/> WO ESTIM 1ST 15 MIN: CPT | Mod: GA | Performed by: CHIROPRACTOR

## 2018-11-12 PROCEDURE — 98943 CHIROPRACT MANJ XTRSPINL 1/>: CPT | Mod: 51 | Performed by: CHIROPRACTOR

## 2018-11-12 NOTE — PROGRESS NOTES
Visit #:  2    Subjective:  Lucero Cedeno is a 48 year old female who is seen in f/u up for:        Somatic dysfunction of upper extremities  Medial epicondylitis of elbow, right  Right elbow pain.     Since last visit,  Lucero Cedeno reports:    Area of chief complaint:  Extra-spinal :  Symptoms are graded at 4/10. The quality is described as stiff, achey, dull.  Motion has increased, but is still not normal, The pt reports 30%-40%  improvement since initial therapy session.     Since last visit the patient feels that they are 30-40 percent  improved from last visit.       Objective:  The following was observed:    P: palpatory tendernessR ulnaris, R pronator teres:  R>>L  A: static palpation demonstrates intersegmental asymmetry   R: motion palpation notes restricted motion  T: hypertonicity at: pronator teres, R ulnaris, R brachialis :  R>>L    Segmental spinal dysfunction/restrictions found at:  T5, R radial head, R medial epicondyle hypomobility .      Assessment:    Diagnoses:      1. Somatic dysfunction of upper extremities    2. Medial epicondylitis of elbow, right    3. Right elbow pain        Patient's condition:  Patient responding well to therapy    Treatment effectiveness:  Post manipulation there is better intersegmental movement and Patient claims to feel looser post manipulation      Procedures:  CMT:  05911 Chiropractic manipulative treatment extraspinal dysfunction/restriction  Extra-spinal: Activator, R radial head with LAD of the ulna and radialis , Seated    Modalities:  53122: Acupuncture, for 15 minutes:  Points: Ahsi point in R medial elbow and forearm  For 15 minutes    Therapeutic procedures:  None    Response to Treatment  No increase in sx pt stable     Prognosis: Good    Progress towards Goals: Patient is making progress towards the goal.   Goals:  USING A COMPUTER: the patient specific goals is to attain his pre-injury status of 4-6 hours      Recommendations:    Instructions:expect  soreness, FU with OT     Follow-up:  Return to care in 1 week with ACP.

## 2018-11-12 NOTE — PROGRESS NOTES
SOAP note objective information for 11/12/2018    Please refer to the daily flowsheet for treatment today, total treatment time and time spent performing 1:1 timed codes.         Objective:  Pain Report:  VAS(0-10) 4/24/18 5/11/18 5/17/18 6/28/18 8/21/18 9/4/18   At Rest: 0/10 post CSI  4-5/10 pre CSI R: 0/10 R: 0/10 R: 4-5/10 R: 0-2/10 0/10   With Use: 2-3/10 post CSI  6-7/10 Post CSI R: 1-2 R: 1-2 R: 6-7/10 R: 6/10 5/10     VAS(0-10) 9/10/2018 9/18/18 9/24/18 10/8/18 10/15/18 10/22/18 10/26/18   At Rest: 0/10 0-2/10 0-2 0-1 0-1 0-1 0-3   With Use: 4/10 0-4/10 0-2 0-2 0-2 0-2 5 at worst     VAS(0-10) 10/29/18 11/5/18 11/12/18       At Rest: 0 0        With Use: 3 3            Location:  R Medial Elbow/Medial Epicondyle  Description:  Tender sore feeling, bruised   Frequency:  infrequent   Pain is worse: During the day, has improved during the night  Pain is exacerbated by:  Squeezing and chopping  Pain is relieved by:  Splint, rest, massage/exercises  Progression since onset: improving     Palpation  Date 4/24/18 5/11/18 5/17/18 6/28/18 8/21/18 9/10/18   Side Right Right Right Right Right Right   MEP NT due to injection Minimal  ++ ++ + to ++   Cubital Tunnel + mild -  - - -   Flexor Wad neg ++ + ++ - -                       Date 9/24/2018 10/1/18 10/8/18 10/15/2018 10/22/18 10/26/18 10/29/18   Side Right Right Right Right Right Right Right   MEP - min + + ++ ++ ++   Cubital Tunnel -         Flexor Wad + + + + + + +                         Date 11/5/2018 11/12/2018        Side Right Right Right Right Right Right Right   MEP -         Cubital Tunnel -         Flexor Wad +                                   Resisted Testing 5/17/18 8/21/18 9/10/18 9/24/18 10/1/18 10/15/18 10/22/18 10/26/18 10/29/18   FCU - + + -    -    Pronator - + + + + min + + +   Finger flexors - +  + + -   ++ ++                             Resisted Testing 11/5/2018 11/12/18          FCU +           Pronator +           Finger flexors                                         ULTT 9/10/18 9/24/18 10/1/18 10/8/18 10/22/18 10/29/18 11/5/2018 11/12/18   Ulnar ~30% NT NT NT ~50% ~50% ~60% ~70%                                                 ROM: Elbow AROM (PROM)  4/24/18 Date: 4/24/18   Left Side: Right   5 Extension 5   140 Flexion 140   90 Supination 90   80 Pronation 80     ROM: Wrist AROM (PROM)  4/24 Date: 4/24 5/18 8/21 10/26   Left Side: Right Right Right Right   75 Extension 67 75 75 75 wnl   80 Flexion 75 80 77 80 wnl   25 RD 25      40 UD 37         and Pinch Strength (pounds)  8/21 Date: 8/21 10/26   Left    Side  Right Right   82        # 1                # 2                # 3         Average 30 to pain 70-   22  3 Point  # 1               # 2               # 3        Average 16- 17-   20  Lateral  # 1                # 2                # 3         Average 13+ 17-     Edema:  [x]        None  []         Mild   []        Moderate  []         Severe     []         of affected part    Scar/Wound:  None    Sensation: [x]         WNL throughout all nerve distributions; per patient report    Special Tests:   Tinels over Cubital Tunnel: neg       Home Program:  Avoid underhand lifting, excessive gripping, activities that aggravate pain  OTC Wrist cock up splint for work and tasks that involve lifting/gripping  Tennis elbow strap (over flexors) as prescribed by MD prn  Self massage to FA flexors with ball   Gentle, pain free prolonged FA flexor and extensor stretches  FM to MEP  Eccentric wrist strengthening - on hold due to exacerbated symptoms   Can wean into more functional tasks being mindful of pain  Spiky ball   Ulnar nerve glide  Foam roller    Next visit:   Finish instructing in foam roller  US  Ionto  STM/FM  Stretches    Please refer to the daily flowsheet for treatment today, total treatment time and time spent performing 1:1 timed codes.

## 2018-11-19 ENCOUNTER — THERAPY VISIT (OUTPATIENT)
Dept: CHIROPRACTIC MEDICINE | Facility: CLINIC | Age: 48
End: 2018-11-19
Payer: COMMERCIAL

## 2018-11-19 DIAGNOSIS — M77.01 MEDIAL EPICONDYLITIS OF ELBOW, RIGHT: ICD-10-CM

## 2018-11-19 DIAGNOSIS — M99.07 SOMATIC DYSFUNCTION OF UPPER EXTREMITIES: Primary | ICD-10-CM

## 2018-11-19 DIAGNOSIS — M25.521 RIGHT ELBOW PAIN: ICD-10-CM

## 2018-11-19 PROCEDURE — 98943 CHIROPRACT MANJ XTRSPINL 1/>: CPT | Mod: 51 | Performed by: CHIROPRACTOR

## 2018-11-19 PROCEDURE — 97810 ACUP 1/> WO ESTIM 1ST 15 MIN: CPT | Mod: GA | Performed by: CHIROPRACTOR

## 2018-11-19 PROCEDURE — 98940 CHIROPRACT MANJ 1-2 REGIONS: CPT | Mod: AT | Performed by: CHIROPRACTOR

## 2018-11-19 NOTE — PROGRESS NOTES
Visit #:  3    Subjective:  Lucero Cedeno is a 48 year old female who is seen in f/u up for:        Somatic dysfunction of upper extremities  Medial epicondylitis of elbow, right  Right elbow pain.     Since last visit,  Lucero Cedeno reports:    Area of chief complaint:  Extra-spinal :  Symptoms are graded at 5/10. The quality is described as stiff, achey, dull.  Motion has increased, but is still not normal, The pt reports 30% improvement since initial therapy session. She was quite sore in the R medial elbow post ACP. 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 She notes moderate soreness in the elbow area. The pt denies weakness in the extremities or other unusual sx.     Since last visit the patient feels that they are 30 percent  improved from last visit-exacerbation/regression       Objective:  The following was observed:    P: palpatory tendernessR ulnaris, R pronator teres:  R>>L  A: static palpation demonstrates intersegmental asymmetry   R: motion palpation notes restricted motion  T: hypertonicity at: pronator teres, R ulnaris, R brachialis :  R>>L    Segmental spinal dysfunction/restrictions found at:  T5, R radial head, R medial epicondyle hypomobility .      Assessment:    Diagnoses:      1. Somatic dysfunction of upper extremities    2. Medial epicondylitis of elbow, right    3. Right elbow pain        Patient's condition:  Patient responding well to therapy    Treatment effectiveness:  Post manipulation there is better intersegmental movement and Patient claims to feel looser post manipulation      Procedures:  CMT:  51219 Chiropractic manipulative treatment extraspinal dysfunction/restriction  Extra-spinal: Activator, R radial head with LAD of the ulna and radialis , Seated    Modalities:  85848: Acupuncture, for 15 minutes:  Points: Ahsi point in R medial elbow and forearm  For 15 minutes    Therapeutic procedures:  None    Response to Treatment  No increase in sx pt stable     Prognosis:  Good    Progress towards Goals: Patient is making progress towards the goal.   Goals:  USING A COMPUTER: the patient specific goals is to attain his pre-injury status of 4-6 hours      Recommendations:    Instructions:expect soreness, FU with OT     Follow-up:  Return to care in 1 week with ACP.

## 2018-11-26 ENCOUNTER — THERAPY VISIT (OUTPATIENT)
Dept: CHIROPRACTIC MEDICINE | Facility: CLINIC | Age: 48
End: 2018-11-26
Payer: COMMERCIAL

## 2018-11-26 DIAGNOSIS — M25.521 RIGHT ELBOW PAIN: ICD-10-CM

## 2018-11-26 DIAGNOSIS — M99.07 SOMATIC DYSFUNCTION OF UPPER EXTREMITIES: Primary | ICD-10-CM

## 2018-11-26 DIAGNOSIS — M77.01 MEDIAL EPICONDYLITIS OF ELBOW, RIGHT: ICD-10-CM

## 2018-11-26 PROCEDURE — 97112 NEUROMUSCULAR REEDUCATION: CPT | Performed by: CHIROPRACTOR

## 2018-11-26 PROCEDURE — 98943 CHIROPRACT MANJ XTRSPINL 1/>: CPT | Performed by: CHIROPRACTOR

## 2018-11-26 PROCEDURE — 97810 ACUP 1/> WO ESTIM 1ST 15 MIN: CPT | Mod: GA | Performed by: CHIROPRACTOR

## 2018-11-26 NOTE — PROGRESS NOTES
Visit #:  4    Subjective:  Lucero Cedeno is a 48 year old female who is seen in f/u up for:        Somatic dysfunction of upper extremities  Medial epicondylitis of elbow, right  Right elbow pain.     Since last visit,  Lucero Cedeno reports:    Area of chief complaint:  Extra-spinal :  Symptoms are graded at 4/10. The quality is described as stiff, achey, dull.  Motion has increased, but is still not normal, The pt reports 40% improvement since initial therapy session. She notes decreasing inflammation in the R medial elbow when performing keyboarding. The pt notes sitting will increase R shoulder and medial elbow pain. She is pleased with her improvement. The pt denies weakness in the extremities.     Since last visit the patient feels that they are 40 percent  improved from last visit     Objective:  The following was observed:    P: palpatory tendernessR ulnaris, R pronator teres:  R>>L  A: static palpation demonstrates intersegmental asymmetry   R: motion palpation notes restricted motion  T: hypertonicity at: pronator teres, R ulnaris, R brachialis :  R>>L    Segmental spinal dysfunction/restrictions found at:  T5, R radial head, R medial epicondyle hypomobility .      Assessment:    Diagnoses:      1. Somatic dysfunction of upper extremities    2. Medial epicondylitis of elbow, right    3. Right elbow pain        Patient's condition:  Patient responding well to therapy    Treatment effectiveness:  Post manipulation there is better intersegmental movement and Patient claims to feel looser post manipulation      Procedures:  CMT:  71046 Chiropractic manipulative treatment extraspinal dysfunction/restriction  Extra-spinal: Activator, R radial head with LAD of the ulna and radialis , Seated    Modalities:  61147: Acupuncture, for 15 minutes:  Points: Ahsi point in R medial elbow and forearm  For 15 minutes    Therapeutic procedures:  57134: Neurological re-education/proprioception training and proper long term  sitting posture: Corrected patient's seated posture when sitting for longer than 20 minutes or seated at the computer related to work duties for over 8 hours per day. Fit patient with Haleigh lumbar support for postural re-training with demonstration. Showed patient how to place the support correctly when seated and to increase usage by 2-3 hour increments per day until they are able to sit full time without spinal irritation. Related improper vs. proper sitting to optimal spinal biomechanics using the spine model with demonstration with the purpose of PREVENTING premature spinal degeneration from cumulative static motion. Demonstrated the increase in load and shearing forces on the spine in addition to the cumulative degenerative effects of axial compression on a spine that is chronically slumped and in an 'unlocked' position vs a 'locked' position. Demonstrated the use of a lap top table for lap top computers to prevent excessive cervical flexion of the neck. Demonstrated 'hip hinging' to access the computer when seated rather than thoracic flexion. Gave cervical retraction for proper cervical alignment, anterior deep cervical flexor strengthening and cervical proprioception training with demonstration. Per 10-12 minutes total    By Aileen SHABAZZ    Response to Treatment  No increase in sx pt stable     Prognosis: Good    Progress towards Goals: Patient is making progress towards the goal.   Goals:  USING A COMPUTER: the patient specific goals is to attain his pre-injury status of 4-6 hours      Recommendations:    Instructions:expect soreness, FU with OT     Follow-up:  Return to care in 1 week   Pec*  handstretching

## 2018-11-28 ENCOUNTER — THERAPY VISIT (OUTPATIENT)
Dept: OCCUPATIONAL THERAPY | Facility: CLINIC | Age: 48
End: 2018-11-28
Payer: COMMERCIAL

## 2018-11-28 DIAGNOSIS — M25.521 RIGHT ELBOW PAIN: ICD-10-CM

## 2018-11-28 DIAGNOSIS — M65.929 TENOSYNOVITIS OF ELBOW: ICD-10-CM

## 2018-11-28 PROCEDURE — 97033 APP MDLTY 1+IONTPHRSIS EA 15: CPT | Mod: GO | Performed by: OCCUPATIONAL THERAPIST

## 2018-11-28 PROCEDURE — 97110 THERAPEUTIC EXERCISES: CPT | Mod: GO | Performed by: OCCUPATIONAL THERAPIST

## 2018-11-28 NOTE — MR AVS SNAPSHOT
After Visit Summary   11/28/2018    Lucero Cedeno    MRN: [id number]           Patient Information     Date Of Birth          1970        Visit Information        Provider Department      11/28/2018 8:30 AM [facility]        Today's Diagnoses     Tenosynovitis of elbow        Right elbow pain           Follow-ups after your visit        Your next 10 appointments already scheduled     Nov 30, 2018 10:00 AM CST   CK Hand with Faby Tracy, OT   [facility] ([facility] [facility])    [address] [address]  [place] [address]   [phone]            Dec 07, 2018 10:15 AM CST   Chiro Acupuncture Follow Up with Melina Hernández DC   [facility] [place] [facility] ([facility] [place]    [address] [place] [address] Drive  [address]  [place] [address]   [phone]            Dec 13, 2018 12:00 PM CST   [facility] ([facility])    [address] [place] [address]  [place] [address]   [phone]            Dec 18, 2018  2:30 PM CST   CK Hand with Faby Tracy, OT   [facility]    [address] [address]  [place] [address]   [phone]            Dec 19, 2018 10:00 AM CST   Chiro Acupuncture Follow Up with Melina Hernández DC   [facility] ([facility] [place]    [address] [place] [address] Drive  [address]  [place] [address]   [phone]            Dec 20, 2018  9:30 AM CST   CK Hand with Danette Kunz   [facility]    [address] [address]  [place] [address] [phone]            Dec 27, 2018 10:30 AM CST   CK Hand with Danette Kunz   [facility]    [address] [address]  [place] [address] [phone]              Who to contact     If you have questions or need follow up information  about today's clinic visit or your schedule please contact [facility] directly at [phone].  Normal or non-critical lab and imaging results will be communicated to you by MyChart, letter or phone within 4 business days after the clinic has received the results. If you do not hear from us within 7 days, please contact the clinic through MyChart or phone. If you have a critical or abnormal lab result, we will notify you by phone as soon as possible.  Submit refill requests through [employer] or call your pharmacy and they will forward the refill request to us. Please allow 3 business days for your refill to be completed.          Additional Information About Your Visit        Care EveryWhere ID     This is your Care EveryWhere ID. This could be used by other organizations to access your [facility] medical records  [id number]         Blood Pressure from Last 3 Encounters:   06/21/11 115/75    Weight from Last 3 Encounters:   06/21/11 71.7 kg (158 lb)              We Performed the Following     ELECTRIC CURRENT THERAPY     THERAPEUTIC EXERCISES        Primary Care Provider Office Phone # Fax #    Snehal Solitaroi [phone]       [address] [place] [address] [place] [address]        Equal Access to Services     TEJAS MUHAMMAD : Hadii susan wood hadasho Sosamali, waaxda luqadaha, qaybta kaalmada adejeanyada, alfredo naidu. So [facility] [phone].    ATENCIÓN: Si habla español, tiene a ramesh disposición servicios gratuitos de asistencia lingüística. Jasmine al [phone].    We comply with applicable federal civil rights laws and [place] laws. We do not discriminate on the basis of race, color, national origin, age, disability, sex, sexual orientation, or gender identity.            Thank you!     Thank you for choosing [facility]  for your care. Our goal is always to provide you with excellent care. Hearing back from our patients is one way we can  continue to improve our services. Please take a few minutes to complete the written survey that you may receive in the mail after your visit with us. Thank you!             Your Updated Medication List - Protect others around you: Learn how to safely use, store and throw away your medicines at www.disposemymeds.org.          This list is accurate as of 11/28/18  1:53 PM.  Always use your most recent med list.                   Brand Name Dispense Instructions for use Diagnosis    CALCIUM + D PO      Take  by mouth.        FISH OIL PO      Take  by mouth.        MULTIVITAMIN PO      Take  by mouth.        spironolactone 25 MG tablet    ALDACTONE     Take 25 mg by mouth daily.        venlafaxine 50 MG tablet    EFFEXOR     Take 50 mg by mouth 3 times daily.        vitamin D 400 units capsule      Take 1 capsule by mouth daily.

## 2018-11-28 NOTE — PROGRESS NOTES
SOAP note objective information for 11/28/2018    Please refer to the daily flowsheet for treatment today, total treatment time and time spent performing 1:1 timed codes.         Objective:  Pain Report:  VAS(0-10) 4/24/18 5/11/18 5/17/18 6/28/18 8/21/18 9/4/18   At Rest: 0/10 post CSI  4-5/10 pre CSI R: 0/10 R: 0/10 R: 4-5/10 R: 0-2/10 0/10   With Use: 2-3/10 post CSI  6-7/10 Post CSI R: 1-2 R: 1-2 R: 6-7/10 R: 6/10 5/10     VAS(0-10) 9/10/2018 9/18/18 9/24/18 10/8/18 10/15/18 10/22/18 10/26/18   At Rest: 0/10 0-2/10 0-2 0-1 0-1 0-1 0-3   With Use: 4/10 0-4/10 0-2 0-2 0-2 0-2 5 at worst     VAS(0-10) 10/29/18 11/5/18 11/28/18       At Rest: 0 0 0       With Use: 3 3 2           Location:  R Medial Elbow/Medial Epicondyle  Description:  Tender sore feeling, bruised   Frequency:  infrequent   Pain is worse: During the day, has improved during the night  Pain is exacerbated by:  Squeezing and chopping  Pain is relieved by:  Splint, rest, massage/exercises  Progression since onset: improving     Palpation  Date 4/24/18 5/11/18 5/17/18 6/28/18 8/21/18 9/10/18   Side Right Right Right Right Right Right   MEP NT due to injection Minimal  ++ ++ + to ++   Cubital Tunnel + mild -  - - -   Flexor Wad neg ++ + ++ - -                       Date 9/24/2018 10/1/18 10/8/18 10/15/2018 10/22/18 10/26/18 10/29/18   Side Right Right Right Right Right Right Right   MEP - min + + ++ ++ ++   Cubital Tunnel -         Flexor Wad + + + + + + +                         Date 11/5/2018 11/28/2018        Side Right Right Right Right Right Right Right   MEP - +        Cubital Tunnel -         Flexor Wad + +                                  Resisted Testing 5/17/18 8/21/18 9/10/18 9/24/18 10/1/18 10/15/18 10/22/18 10/26/18 10/29/18   FCU - + + -    -    Pronator - + + + + min + + +   Finger flexors - +  + + -   ++ ++                             Resisted Testing 11/5/2018 11/28/18          FCU + +          Pronator + +          Finger flexors                                         ULTT 9/10/18 9/24/18 10/1/18 10/8/18 10/22/18 10/29/18 11/5/2018 11/12/18 11/28/18   Ulnar ~30% NT NT NT ~50% ~50% ~60% ~70% ~80%                                                     ROM: Elbow AROM (PROM)  4/24/18 Date: 4/24/18   Left Side: Right   5 Extension 5   140 Flexion 140   90 Supination 90   80 Pronation 80     ROM: Wrist AROM (PROM)  4/24 Date: 4/24 5/18 8/21 10/26   Left Side: Right Right Right Right   75 Extension 67 75 75 75 wnl   80 Flexion 75 80 77 80 wnl   25 RD 25      40 UD 37         and Pinch Strength (pounds)  8/21 Date: 8/21 10/26   Left    Side  Right Right   82        # 1                # 2                # 3         Average 30 to pain 70-   22  3 Point  # 1               # 2               # 3        Average 16- 17-   20  Lateral  # 1                # 2                # 3         Average 13+ 17-     Edema:  [x]        None  []         Mild   []        Moderate  []         Severe     []         of affected part    Scar/Wound:  None    Sensation: [x]         WNL throughout all nerve distributions; per patient report    Special Tests:   Tinels over Cubital Tunnel: neg       Home Program:  Avoid underhand lifting, excessive gripping, activities that aggravate pain  OTC Wrist cock up splint for work and tasks that involve lifting/gripping  Tennis elbow strap (over flexors) as prescribed by MD prn  Self massage to FA flexors with ball   Gentle, pain free prolonged FA flexor and extensor stretches  FM to MEP  Eccentric wrist strengthening - on hold due to exacerbated symptoms   Can wean into more functional tasks being mindful of pain  Spiky ball   Ulnar nerve glide  Foam roller  Core strengthening #1  Scapular strengthening  tricep massage can    Next visit:   Finish instructing in foam roller  US  Ionto  STM/FM  Stretches    Please refer to the daily flowsheet for treatment today, total treatment time and time spent performing 1:1 timed codes.

## 2018-12-07 ENCOUNTER — THERAPY VISIT (OUTPATIENT)
Dept: CHIROPRACTIC MEDICINE | Facility: CLINIC | Age: 48
End: 2018-12-07
Payer: COMMERCIAL

## 2018-12-07 DIAGNOSIS — M25.521 RIGHT ELBOW PAIN: ICD-10-CM

## 2018-12-07 DIAGNOSIS — M99.07 SOMATIC DYSFUNCTION OF UPPER EXTREMITIES: Primary | ICD-10-CM

## 2018-12-07 DIAGNOSIS — M77.01 MEDIAL EPICONDYLITIS OF ELBOW, RIGHT: ICD-10-CM

## 2018-12-07 PROCEDURE — 97810 ACUP 1/> WO ESTIM 1ST 15 MIN: CPT | Mod: GA | Performed by: CHIROPRACTOR

## 2018-12-07 PROCEDURE — 98943 CHIROPRACT MANJ XTRSPINL 1/>: CPT | Mod: 51 | Performed by: CHIROPRACTOR

## 2018-12-07 PROCEDURE — 97110 THERAPEUTIC EXERCISES: CPT | Performed by: CHIROPRACTOR

## 2018-12-13 ENCOUNTER — THERAPY VISIT (OUTPATIENT)
Dept: OCCUPATIONAL THERAPY | Facility: CLINIC | Age: 48
End: 2018-12-13
Payer: COMMERCIAL

## 2018-12-13 DIAGNOSIS — M65.929 TENOSYNOVITIS OF ELBOW: ICD-10-CM

## 2018-12-13 DIAGNOSIS — M25.521 RIGHT ELBOW PAIN: ICD-10-CM

## 2018-12-13 PROCEDURE — 97140 MANUAL THERAPY 1/> REGIONS: CPT | Mod: GO | Performed by: OCCUPATIONAL THERAPIST

## 2018-12-13 PROCEDURE — 97110 THERAPEUTIC EXERCISES: CPT | Mod: GO | Performed by: OCCUPATIONAL THERAPIST

## 2018-12-13 PROCEDURE — 97035 APP MDLTY 1+ULTRASOUND EA 15: CPT | Mod: GO | Performed by: OCCUPATIONAL THERAPIST

## 2018-12-13 NOTE — PROGRESS NOTES
SOAP note objective information for 12/13/2018    Please refer to the daily flowsheet for treatment today, total treatment time and time spent performing 1:1 timed codes.         Objective:  Pain Report:  VAS(0-10) 4/24/18 5/11/18 5/17/18 6/28/18 8/21/18 9/4/18   At Rest: 0/10 post CSI  4-5/10 pre CSI R: 0/10 R: 0/10 R: 4-5/10 R: 0-2/10 0/10   With Use: 2-3/10 post CSI  6-7/10 Post CSI R: 1-2 R: 1-2 R: 6-7/10 R: 6/10 5/10     VAS(0-10) 9/10/2018 9/18/18 9/24/18 10/8/18 10/15/18 10/22/18 10/26/18   At Rest: 0/10 0-2/10 0-2 0-1 0-1 0-1 0-3   With Use: 4/10 0-4/10 0-2 0-2 0-2 0-2 5 at worst     VAS(0-10) 10/29/18 11/5/18 11/28/18 12/13/18      At Rest: 0 0 0 0      With Use: 3 3 2 1-2          Location:  R Medial Elbow/Medial Epicondyle  Description:  Tender sore feeling, bruised   Frequency:  infrequent   Pain is worse: During the day, has improved during the night  Pain is exacerbated by:  Squeezing and chopping  Pain is relieved by:  Splint, rest, massage/exercises  Progression since onset: improving     Palpation  Date 4/24/18 5/11/18 5/17/18 6/28/18 8/21/18 9/10/18   Side Right Right Right Right Right Right   MEP NT due to injection Minimal  ++ ++ + to ++   Cubital Tunnel + mild -  - - -   Flexor Wad neg ++ + ++ - -                       Date 9/24/2018 10/1/18 10/8/18 10/15/2018 10/22/18 10/26/18 10/29/18   Side Right Right Right Right Right Right Right   MEP - min + + ++ ++ ++   Cubital Tunnel -         Flexor Wad + + + + + + +                         Date 11/5/2018 11/28/2018        Side Right Right Right Right Right Right Right   MEP - +        Cubital Tunnel -         Flexor Wad + +                                  Resisted Testing 5/17/18 8/21/18 9/10/18 9/24/18 10/1/18 10/15/18 10/22/18 10/26/18 10/29/18   FCU - + + -    -    Pronator - + + + + min + + +   Finger flexors - +  + + -   ++ ++                             Resisted Testing 11/5/2018 11/28/18          FCU + +          Pronator + +          Finger  flexors                                        ULTT 9/10/18 9/24/18 10/1/18 10/8/18 10/22/18 10/29/18 11/5/2018 11/12/18 11/28/18   Ulnar ~30% NT NT NT ~50% ~50% ~60% ~70% ~80%                                                     ROM: Elbow AROM (PROM)  4/24/18 Date: 4/24/18   Left Side: Right   5 Extension 5   140 Flexion 140   90 Supination 90   80 Pronation 80     ROM: Wrist AROM (PROM)  4/24 Date: 4/24 5/18 8/21 10/26   Left Side: Right Right Right Right   75 Extension 67 75 75 75 wnl   80 Flexion 75 80 77 80 wnl   25 RD 25      40 UD 37         and Pinch Strength (pounds)  8/21 Date: 8/21 10/26   Left    Side  Right Right   82        # 1                # 2                # 3         Average 30 to pain 70-   22  3 Point  # 1               # 2               # 3        Average 16- 17-   20  Lateral  # 1                # 2                # 3         Average 13+ 17-     Edema:  [x]        None  []         Mild   []        Moderate  []         Severe     []         of affected part    Scar/Wound:  None    Sensation: [x]         WNL throughout all nerve distributions; per patient report    Special Tests:   Tinels over Cubital Tunnel: neg       Home Program:  Avoid underhand lifting, excessive gripping, activities that aggravate pain  OTC Wrist cock up splint for work and tasks that involve lifting/gripping  Tennis elbow strap (over flexors) as prescribed by MD prn  Self massage to FA flexors with ball   Gentle, pain free prolonged FA flexor and extensor stretches  FM to MEP  Eccentric wrist strengthening - on hold due to exacerbated symptoms   Can wean into more functional tasks being mindful of pain  Spiky ball   Ulnar nerve glide  Foam roller  Core strengthening #1  Scapular strengthening  tricep massage can  pec wall stretch (minor and major)    Next visit:   Finish instructing in foam roller  US  Ionto  STM/FM  Stretches    Please refer to the daily flowsheet for treatment today, total treatment time and  time spent performing 1:1 timed codes.

## 2018-12-19 ENCOUNTER — THERAPY VISIT (OUTPATIENT)
Dept: CHIROPRACTIC MEDICINE | Facility: CLINIC | Age: 48
End: 2018-12-19
Payer: COMMERCIAL

## 2018-12-19 DIAGNOSIS — M77.01 MEDIAL EPICONDYLITIS OF ELBOW, RIGHT: ICD-10-CM

## 2018-12-19 DIAGNOSIS — M25.521 RIGHT ELBOW PAIN: ICD-10-CM

## 2018-12-19 DIAGNOSIS — M99.07 SOMATIC DYSFUNCTION OF UPPER EXTREMITIES: Primary | ICD-10-CM

## 2018-12-19 DIAGNOSIS — M99.02 THORACIC SEGMENT DYSFUNCTION: ICD-10-CM

## 2018-12-19 PROCEDURE — 98940 CHIROPRACT MANJ 1-2 REGIONS: CPT | Mod: AT | Performed by: CHIROPRACTOR

## 2018-12-19 PROCEDURE — 97810 ACUP 1/> WO ESTIM 1ST 15 MIN: CPT | Mod: GA | Performed by: CHIROPRACTOR

## 2018-12-19 NOTE — PROGRESS NOTES
Visit #:  6    Subjective:  Lucero Cedeno is a 48 year old female who is seen in f/u up for:        Somatic dysfunction of upper extremities  Medial epicondylitis of elbow, right  Right elbow pain.     Since last visit,  Lucero Cedeno reports:    Area of chief complaint:  Extra-spinal :  Symptoms are graded at 2/10. The quality is described as stiff, achey, dull.  Motion has increased, but is still not normal, The pt reports 75% improvement since initial presentation and is doing well. She is not using her computer for extended hours. She is able to perform all activities. She states the px has moved to a more medial area on the R elbow. She denies weakness in the extremities or other unusual sx.       Since last visit the patient feels that they are 75 percent  improved from last visit     Objective:  The following was observed:    P: palpatory tendernessR ulnaris, R pronator teres:  R>>L  A: static palpation demonstrates intersegmental asymmetry   R: motion palpation notes restricted motion  T: hypertonicity at: pronator teres, R ulnaris, R brachialis :  R>>L    Segmental spinal dysfunction/restrictions found at:  T5, R radial head, R medial epicondyle hypomobility .      Assessment:    Diagnoses:      1. Somatic dysfunction of upper extremities    2. Medial epicondylitis of elbow, right    3. Right elbow pain        Patient's condition:  Patient responding well to therapy    Treatment effectiveness:  Post manipulation there is better intersegmental movement and Patient claims to feel looser post manipulation      Procedures:  [id number]: T spine adjustment T4, T9, PRONE       [id number] Chiropractic manipulative treatment extraspinal dysfunction/restriction  Extra-spinal: Activator, R radial head with LAD of the ulna and radialis , Seated    Modalities:  [id number]: Acupuncture, for 15 minutes:  Points: Ahsi point in R medial elbow and forearm  For 15 minutes    Therapeutic procedures:  Review of stretching     By Aileen  PTA    Response to Treatment  No increase in sx pt stable     Prognosis: Good    Progress towards Goals: Patient is making progress towards the goal.   Goals:  USING A COMPUTER: the patient specific goals is to attain his pre-injury status of 4-6 hours      Recommendations:    Instructions:expect soreness, FU with OT     Follow-up:  Return to care in 3 weeks

## 2018-12-20 ENCOUNTER — THERAPY VISIT (OUTPATIENT)
Dept: OCCUPATIONAL THERAPY | Facility: CLINIC | Age: 48
End: 2018-12-20
Payer: COMMERCIAL

## 2018-12-20 DIAGNOSIS — M65.929 TENOSYNOVITIS OF ELBOW: ICD-10-CM

## 2018-12-20 DIAGNOSIS — M25.521 RIGHT ELBOW PAIN: ICD-10-CM

## 2018-12-20 PROCEDURE — 97110 THERAPEUTIC EXERCISES: CPT | Mod: GO | Performed by: OCCUPATIONAL THERAPIST

## 2018-12-20 PROCEDURE — 97140 MANUAL THERAPY 1/> REGIONS: CPT | Mod: GO | Performed by: OCCUPATIONAL THERAPIST

## 2018-12-20 PROCEDURE — 97035 APP MDLTY 1+ULTRASOUND EA 15: CPT | Mod: GO | Performed by: OCCUPATIONAL THERAPIST

## 2018-12-20 NOTE — PROGRESS NOTES
SOAP note objective information for 12/20/2018    Please refer to the daily flowsheet for treatment today, total treatment time and time spent performing 1:1 timed codes.         Objective:  Pain Report:  VAS(0-10) 4/24/18 5/11/18 5/17/18 6/28/18 8/21/18 9/4/18   At Rest: 0/10 post CSI  4-5/10 pre CSI R: 0/10 R: 0/10 R: 4-5/10 R: 0-2/10 0/10   With Use: 2-3/10 post CSI  6-7/10 Post CSI R: 1-2 R: 1-2 R: 6-7/10 R: 6/10 5/10     VAS(0-10) 9/10/2018 9/18/18 9/24/18 10/8/18 10/15/18 10/22/18 10/26/18   At Rest: 0/10 0-2/10 0-2 0-1 0-1 0-1 0-3   With Use: 4/10 0-4/10 0-2 0-2 0-2 0-2 5 at worst     VAS(0-10) 10/29/18 11/5/18 11/28/18 12/20/2018      At Rest: 0 0 0 0      With Use: 3 3 2 1-2          Location:  R Medial Elbow/Medial Epicondyle  Description:  Tender sore feeling, bruised   Frequency:  infrequent   Pain is worse: During the day, has improved during the night  Pain is exacerbated by:  Squeezing and chopping  Pain is relieved by:  Splint, rest, massage/exercises  Progression since onset: improving     Palpation  Date 4/24/18 5/11/18 5/17/18 6/28/18 8/21/18 9/10/18   Side Right Right Right Right Right Right   MEP NT due to injection Minimal  ++ ++ + to ++   Cubital Tunnel + mild -  - - -   Flexor Wad neg ++ + ++ - -                       Date 9/24/2018 10/1/18 10/8/18 10/15/2018 10/22/18 10/26/18 10/29/18   Side Right Right Right Right Right Right Right   MEP - min + + ++ ++ ++   Cubital Tunnel -         Flexor Wad + + + + + + +                         Date 11/5/2018 11/28/2018 12/20/2018       Side Right Right Right Right Right Right Right   MEP - + +       Cubital Tunnel -         Flexor Wad + + +                                 Resisted Testing 5/17/18 8/21/18 9/10/18 9/24/18 10/1/18 10/15/18 10/22/18 10/26/18 10/29/18   FCU - + + -    -    Pronator - + + + + min + + +   Finger flexors - +  + + -   ++ ++                             Resisted Testing 11/5/2018 11/28/18          FCU + +          Pronator + +           Finger flexors                                        ULTT 9/10/18 9/24/18 10/1/18 10/8/18 10/22/18 10/29/18 11/5/2018 11/12/18 11/28/18   Ulnar ~30% NT NT NT ~50% ~50% ~60% ~70% ~80%                                                     ULTT 12/20/18           Ulnar ~80%                                                               ROM: Elbow AROM (PROM)  4/24/18 Date: 4/24/18   Left Side: Right   5 Extension 5   140 Flexion 140   90 Supination 90   80 Pronation 80     ROM: Wrist AROM (PROM)  4/24 Date: 4/24 5/18 8/21 10/26   Left Side: Right Right Right Right   75 Extension 67 75 75 75 wnl   80 Flexion 75 80 77 80 wnl   25 RD 25      40 UD 37         and Pinch Strength (pounds)  8/21 Date: 8/21 10/26   Left    Side  Right Right   82        # 1                # 2                # 3         Average 30 to pain 70-   22  3 Point  # 1               # 2               # 3        Average 16- 17-   20  Lateral  # 1                # 2                # 3         Average 13+ 17-     Edema:  [x]        None  []         Mild   []        Moderate  []         Severe     []         of affected part    Scar/Wound:  None    Sensation: [x]         WNL throughout all nerve distributions; per patient report    Special Tests:   Tinels over Cubital Tunnel: neg       Home Program:  Avoid underhand lifting, excessive gripping, activities that aggravate pain  OTC Wrist cock up splint for work and tasks that involve lifting/gripping  Tennis elbow strap (over flexors) as prescribed by MD prn  Self massage to FA flexors with ball   Gentle, pain free prolonged FA flexor and extensor stretches  FM to MEP  Eccentric wrist strengthening - on hold due to exacerbated symptoms   Can wean into more functional tasks being mindful of pain  Spiky ball   Ulnar nerve glide  Foam roller  Core strengthening #1  Scapular strengthening  tricep massage can  pec wall stretch (minor and major)    Next visit:   Finish instructing in foam  kuldeep PEÑALOZA  Ionto  STM/FM  Stretches    Please refer to the daily flowsheet for treatment today, total treatment time and time spent performing 1:1 timed codes.

## 2019-01-03 ENCOUNTER — THERAPY VISIT (OUTPATIENT)
Dept: OCCUPATIONAL THERAPY | Facility: CLINIC | Age: 49
End: 2019-01-03
Payer: COMMERCIAL

## 2019-01-03 DIAGNOSIS — M77.02 MEDIAL EPICONDYLITIS OF ELBOW, LEFT: Primary | ICD-10-CM

## 2019-01-03 PROCEDURE — 97033 APP MDLTY 1+IONTPHRSIS EA 15: CPT | Mod: GO | Performed by: OCCUPATIONAL THERAPIST

## 2019-01-03 PROCEDURE — 97140 MANUAL THERAPY 1/> REGIONS: CPT | Mod: GO | Performed by: OCCUPATIONAL THERAPIST

## 2019-01-03 NOTE — PROGRESS NOTES
SOAP note objective information for 1/3/2019.    Please refer to the daily flowsheet for treatment today, total treatment time and time spent performing 1:1 timed codes.       Objective:  Pain Report:  VAS(0-10) 4/24/18 5/11/18 5/17/18 6/28/18 8/21/18 9/4/18   At Rest: 0/10 post CSI  4-5/10 pre CSI R: 0/10 R: 0/10 R: 4-5/10 R: 0-2/10 0/10   With Use: 2-3/10 post CSI  6-7/10 Post CSI R: 1-2 R: 1-2 R: 6-7/10 R: 6/10 5/10     VAS(0-10) 9/10/2018 9/18/18 9/24/18 10/8/18 10/15/18 10/22/18 10/26/18   At Rest: 0/10 0-2/10 0-2 0-1 0-1 0-1 0-3   With Use: 4/10 0-4/10 0-2 0-2 0-2 0-2 5 at worst     VAS(0-10) 10/29/18 11/5/18 11/28/18 12/20/2018 1/3/2019     At Rest: 0 0 0 0 0     With Use: 3 3 2 1-2 1-2         Location:  R Medial Elbow/Medial Epicondyle  Description:  Tender sore feeling, bruised   Frequency:  infrequent   Pain is worse: During the day, has improved during the night  Pain is exacerbated by:  Squeezing and chopping  Pain is relieved by:  Splint, rest, massage/exercises  Progression since onset: improving     Palpation  Date 4/24/18 5/11/18 5/17/18 6/28/18 8/21/18 9/10/18   Side Right Right Right Right Right Right   MEP NT due to injection Minimal  ++ ++ + to ++   Cubital Tunnel + mild -  - - -   Flexor Wad neg ++ + ++ - -                       Date 9/24/2018 10/1/18 10/8/18 10/15/2018 10/22/18 10/26/18 10/29/18   Side Right Right Right Right Right Right Right   MEP - min + + ++ ++ ++   Cubital Tunnel -         Flexor Wad + + + + + + +                         Date 11/5/2018 11/28/2018 12/20/2018 1/3/2019      Side Right Right Right Right Right Right Right   MEP - + + +      Cubital Tunnel -         Flexor Wad + + + -                                Resisted Testing 5/17/18 8/21/18 9/10/18 9/24/18 10/1/18 10/15/18 10/22/18 10/26/18 10/29/18   FCU - + + -    -    Pronator - + + + + min + + +   Finger flexors - +  + + -   ++ ++                             Resisted Testing 11/5/2018 11/28/18          FCU + +           Pronator + +          Finger flexors                                        ULTT 9/10/18 9/24/18 10/1/18 10/8/18 10/22/18 10/29/18 11/5/2018 11/12/18 11/28/18   Ulnar ~30% NT NT NT ~50% ~50% ~60% ~70% ~80%                                                     ULTT 12/20/18 1/3/2019          Ulnar ~80% ~70%                                                              ROM: Elbow AROM (PROM)  4/24/18 Date: 4/24/18   Left Side: Right   5 Extension 5   140 Flexion 140   90 Supination 90   80 Pronation 80     ROM: Wrist AROM (PROM)  4/24 Date: 4/24 5/18 8/21 10/26   Left Side: Right Right Right Right   75 Extension 67 75 75 75 wnl   80 Flexion 75 80 77 80 wnl   25 RD 25      40 UD 37         and Pinch Strength (pounds)  8/21 Date: 8/21 10/26    Left    Side  Right Right Right   82        # 1                # 2                # 3         Average 30 to pain 70-    22  3 Point  # 1               # 2               # 3        Average 16- 17-    20  Lateral  # 1                # 2                # 3         Average 13+ 17-      Edema:  [x]        None  []         Mild   []        Moderate  []         Severe     []         of affected part    Scar/Wound:  None    Sensation: [x]         WNL throughout all nerve distributions; per patient report    Special Tests:   Tinels over Cubital Tunnel: neg     Home Program:  Avoid underhand lifting, excessive gripping, activities that aggravate pain  OTC Wrist cock up splint for work and tasks that involve lifting/gripping  Tennis elbow strap (over flexors) as prescribed by MD prn  Self massage to FA flexors with ball   Gentle, pain free prolonged FA flexor and extensor stretches  FM to MEP  Eccentric wrist strengthening - on hold due to exacerbated symptoms   Can wean into more functional tasks being mindful of pain  Spiky ball   Ulnar nerve glide  Foam roller  Core strengthening #1  Scapular strengthening  tricep massage can  pec wall stretch (minor and major)    Next visit:    Finish instructing in foam roller  US  Ionto  STM/FM  Stretches

## 2019-01-11 ENCOUNTER — THERAPY VISIT (OUTPATIENT)
Dept: OCCUPATIONAL THERAPY | Facility: CLINIC | Age: 49
End: 2019-01-11
Payer: COMMERCIAL

## 2019-01-11 DIAGNOSIS — M65.929 TENOSYNOVITIS OF ELBOW: ICD-10-CM

## 2019-01-11 DIAGNOSIS — M25.521 RIGHT ELBOW PAIN: ICD-10-CM

## 2019-01-11 PROCEDURE — 97140 MANUAL THERAPY 1/> REGIONS: CPT | Mod: GO | Performed by: OCCUPATIONAL THERAPIST

## 2019-01-11 PROCEDURE — 97033 APP MDLTY 1+IONTPHRSIS EA 15: CPT | Mod: GO | Performed by: OCCUPATIONAL THERAPIST

## 2019-01-11 PROCEDURE — 97112 NEUROMUSCULAR REEDUCATION: CPT | Mod: GO | Performed by: OCCUPATIONAL THERAPIST

## 2019-01-11 NOTE — PROGRESS NOTES
Progress Note - Hand Therapy  Current Date:  1/11/2019    Diagnosis:  R Elbow Pain (Medial Epicondylitis)  DOI: March 2018  Post:  5 months    Number of visits to date:  34     Reporting period is 10/26/2018 through 1/11/19    Upper Extremity Functional Index Score:  SCORE:   Column Totals: /80: 64   (A lower score indicates greater disability.)    Subjectve:   Subjective changes as noted by patient: Its been feeling pretty good, I joined a gym but have been protecting it. 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  Functional changes noted by patient: Improvement in Work Tasks and Household Chores  Response to previous treatment: good  Patient has noted adverse reaction to: None    Objective:  Pain Report:  VAS(0-10) 4/24/18 5/11/18 5/17/18 6/28/18 8/21/18 9/4/18   At Rest: 0/10 post CSI  4-5/10 pre CSI R: 0/10 R: 0/10 R: 4-5/10 R: 0-2/10 0/10   With Use: 2-3/10 post CSI  6-7/10 Post CSI R: 1-2 R: 1-2 R: 6-7/10 R: 6/10 5/10     VAS(0-10) 9/10/2018 9/18/18 9/24/18 10/8/18 10/15/18 10/22/18 10/26/18 1/11/19   At Rest: 0/10 0-2/10 0-2 0-1 0-1 0-1 0-3 0-1   With Use: 4/10 0-4/10 0-2 0-2 0-2 0-2 5 at worst 0-5      Location:  R Medial Elbow/Medial Epicondyle  Description:  Tender sore feeling, bruised    Frequency:  infrequent   Pain is worse: During the day, has improved during the night  Pain is exacerbated by:  Squeezing and chopping, massage to MEP  Pain is relieved by:  Splint, rest, massage/exercises  Progression since onset: improving - infrequent    Palpation  Date 4/24/18 5/11/18 5/17/18 6/28/18 8/21/18 9/10/18   Side Right Right Right Right Right Right   MEP NT due to injection Minimal  ++ ++ + to ++   Cubital Tunnel + mild -  - - -   Flexor Wad neg ++ + ++ - -                       Date 9/24/2018 10/1/18 10/8/18 10/15/2018 10/22/18 10/26/18 1/11/19   Side Right Right Right Right Right Right Right   MEP - min + + ++ ++ +   Cubital Tunnel  -         Flexor Wad + + + + + + -                           Resisted Testing 5/17/18 8/21/18 9/10/18 9/24/18 10/1/18 10/15/18 10/22/18 10/26/18 1/11/19   FCU - + + -    - -   Pronator - + + + + min + + +   Finger flexors - +  + + -   ++ + ache                             ULTT 9/10/18 9/24/18 10/1/18 10/8/18 10/22/18   Ulnar ~30% NT NT NT ~50%                                     ROM: Elbow AROM (PROM)  4/24/18 Date: 4/24/18   Left Side: Right   5 Extension 5   140 Flexion 140   90 Supination 90   80 Pronation 80     ROM: Wrist AROM (PROM)  4/24 Date: 4/24 5/18 8/21 10/26   Left Side: Right Right Right Right   75 Extension 67 75 75 75 wnl   80 Flexion 75 80 77 80 wnl   25 RD 25      40 UD 37         and Pinch Strength (pounds)  8/21 Date: 8/21 10/26 1/11   Left    Side  Right Right Right   82        # 1                # 2                # 3         Average 30 to pain 70- 78-   22  3 Point  # 1               # 2               # 3        Average 16- 17- 20-   20  Lateral  # 1                # 2                # 3         Average 13+ 17- 20-     Edema:  [x]        None  []         Mild   []        Moderate  []         Severe     []         of affected part    Scar/Wound:  None    Sensation: [x]         WNL throughout all nerve distributions; per patient report    Special Tests:   Tinels over Cubital Tunnel: neg     Assessment:  Response to therapy has been improvement to:  Strength:   and pinch  Pain:  frequency is less, intensity of pain is decreased, duration of pain is decreased and less tender over affected area  Overall Assessment:  Patient's symptoms are resolving.  Patient would benefit from continued therapy to achieve rehab potential  STG/LTG:  See goal sheet for details and updates of remaining functional limitations.     Plan:  I have re-evaluated this patient and find that the nature, scope, duration and intensity of the therapy is appropriate for the medical condition of the patient.  Continue  POC:   Frequency/Duration:  Recommend continuing to see patient  1 X week, once daily  for 6 additional visits     Home Program:  Avoid underhand lifting, excessive gripping, activities that aggravate pain  OTC Wrist cock up splint for work and tasks that involve lifting/gripping  Tennis elbow strap (over flexors) as prescribed by MD prn  Self massage to FA flexors with ball   Gentle, pain free prolonged FA flexor and extensor stretches  FM to MEP  Eccentric wrist strengthening - on hold due to exacerbated symptoms   Can wean into more functional tasks being mindful of pain  Spiky ball   Ulnar nerve glide  Foam roller  Core strengthening #1  Scapular strengthening  tricep massage can  pec wall stretch (minor and major)    Next visit:   US  Ionto  STM/FM  Stretches      Please refer to the daily flowsheet for treatment today, total treatment time and time spent performing 1:1 timed codes.

## 2019-01-18 ENCOUNTER — THERAPY VISIT (OUTPATIENT)
Dept: OCCUPATIONAL THERAPY | Facility: CLINIC | Age: 49
End: 2019-01-18
Payer: COMMERCIAL

## 2019-01-18 DIAGNOSIS — M25.521 RIGHT ELBOW PAIN: ICD-10-CM

## 2019-01-18 DIAGNOSIS — M65.929 TENOSYNOVITIS OF ELBOW: ICD-10-CM

## 2019-01-18 PROCEDURE — 97035 APP MDLTY 1+ULTRASOUND EA 15: CPT | Mod: GO | Performed by: OCCUPATIONAL THERAPIST

## 2019-01-18 PROCEDURE — 97033 APP MDLTY 1+IONTPHRSIS EA 15: CPT | Mod: GO | Performed by: OCCUPATIONAL THERAPIST

## 2019-01-18 PROCEDURE — 97140 MANUAL THERAPY 1/> REGIONS: CPT | Mod: GO | Performed by: OCCUPATIONAL THERAPIST

## 2019-01-18 PROCEDURE — 97110 THERAPEUTIC EXERCISES: CPT | Mod: GO | Performed by: OCCUPATIONAL THERAPIST

## 2019-01-22 ENCOUNTER — THERAPY VISIT (OUTPATIENT)
Dept: OCCUPATIONAL THERAPY | Facility: CLINIC | Age: 49
End: 2019-01-22
Payer: COMMERCIAL

## 2019-01-22 DIAGNOSIS — M65.929 TENOSYNOVITIS OF ELBOW: ICD-10-CM

## 2019-01-22 DIAGNOSIS — M25.521 RIGHT ELBOW PAIN: ICD-10-CM

## 2019-01-22 PROCEDURE — 97110 THERAPEUTIC EXERCISES: CPT | Mod: GO | Performed by: OCCUPATIONAL THERAPIST

## 2019-01-22 PROCEDURE — 97035 APP MDLTY 1+ULTRASOUND EA 15: CPT | Mod: GO | Performed by: OCCUPATIONAL THERAPIST

## 2019-01-22 PROCEDURE — 97033 APP MDLTY 1+IONTPHRSIS EA 15: CPT | Mod: GO | Performed by: OCCUPATIONAL THERAPIST

## 2019-01-22 PROCEDURE — 97140 MANUAL THERAPY 1/> REGIONS: CPT | Mod: GO | Performed by: OCCUPATIONAL THERAPIST

## 2019-02-01 ENCOUNTER — THERAPY VISIT (OUTPATIENT)
Dept: OCCUPATIONAL THERAPY | Facility: CLINIC | Age: 49
End: 2019-02-01
Payer: COMMERCIAL

## 2019-02-01 DIAGNOSIS — M25.521 RIGHT ELBOW PAIN: ICD-10-CM

## 2019-02-01 DIAGNOSIS — M65.929 TENOSYNOVITIS OF ELBOW: ICD-10-CM

## 2019-02-01 PROCEDURE — 97140 MANUAL THERAPY 1/> REGIONS: CPT | Mod: GO | Performed by: OCCUPATIONAL THERAPIST

## 2019-02-01 PROCEDURE — 97035 APP MDLTY 1+ULTRASOUND EA 15: CPT | Mod: GO | Performed by: OCCUPATIONAL THERAPIST

## 2019-02-01 PROCEDURE — 97110 THERAPEUTIC EXERCISES: CPT | Mod: GO | Performed by: OCCUPATIONAL THERAPIST

## 2019-02-05 ENCOUNTER — THERAPY VISIT (OUTPATIENT)
Dept: OCCUPATIONAL THERAPY | Facility: CLINIC | Age: 49
End: 2019-02-05
Payer: COMMERCIAL

## 2019-02-05 DIAGNOSIS — M65.929 TENOSYNOVITIS OF ELBOW: ICD-10-CM

## 2019-02-05 DIAGNOSIS — M25.521 RIGHT ELBOW PAIN: ICD-10-CM

## 2019-02-05 PROCEDURE — 97140 MANUAL THERAPY 1/> REGIONS: CPT | Mod: GO | Performed by: OCCUPATIONAL THERAPIST

## 2019-02-05 PROCEDURE — 97035 APP MDLTY 1+ULTRASOUND EA 15: CPT | Mod: GO | Performed by: OCCUPATIONAL THERAPIST

## 2019-02-05 PROCEDURE — 97110 THERAPEUTIC EXERCISES: CPT | Mod: GO | Performed by: OCCUPATIONAL THERAPIST

## 2019-02-13 ENCOUNTER — THERAPY VISIT (OUTPATIENT)
Dept: OCCUPATIONAL THERAPY | Facility: CLINIC | Age: 49
End: 2019-02-13
Payer: COMMERCIAL

## 2019-02-13 DIAGNOSIS — M77.01 MEDIAL EPICONDYLITIS OF ELBOW, RIGHT: Primary | ICD-10-CM

## 2019-02-13 PROCEDURE — 97112 NEUROMUSCULAR REEDUCATION: CPT | Mod: GO | Performed by: OCCUPATIONAL THERAPIST

## 2019-02-13 PROCEDURE — 97035 APP MDLTY 1+ULTRASOUND EA 15: CPT | Mod: GO | Performed by: OCCUPATIONAL THERAPIST

## 2019-02-13 PROCEDURE — 97140 MANUAL THERAPY 1/> REGIONS: CPT | Mod: GO | Performed by: OCCUPATIONAL THERAPIST

## 2019-02-13 NOTE — PROGRESS NOTES
SOAP note objective information for 2/13/2019.    Please refer to the daily flowsheet for treatment today, total treatment time and time spent performing 1:1 timed codes.       Objective:  Pain Report:  VAS(0-10) 4/24/18 5/11/18 5/17/18 6/28/18 8/21/18 9/4/18   At Rest: 0/10 post CSI  4-5/10 pre CSI R: 0/10 R: 0/10 R: 4-5/10 R: 0-2/10 0/10   With Use: 2-3/10 post CSI  6-7/10 Post CSI R: 1-2 R: 1-2 R: 6-7/10 R: 6/10 5/10     VAS(0-10) 9/10/2018 9/18/18 9/24/18 10/8/18 10/15/18 10/22/18 10/26/18 1/11/19   At Rest: 0/10 0-2/10 0-2 0-1 0-1 0-1 0-3 0-1   With Use: 4/10 0-4/10 0-2 0-2 0-2 0-2 5 at worst 0-5      VAS(0-10) 2/13/19          At Rest: 0-1          With Use: 0-1              Location:  R Medial Elbow/Medial Epicondyle  Description:  Tender sore feeling, bruised    Frequency:  infrequent   Pain is worse: During the day, has improved during the night  Pain is exacerbated by:  Squeezing and chopping, massage to MEP  Pain is relieved by:  Splint, rest, massage/exercises  Progression since onset: improving - infrequent    Palpation  Date 4/24/18 5/11/18 5/17/18 6/28/18 8/21/18 9/10/18   Side Right Right Right Right Right Right   MEP NT due to injection Minimal  ++ ++ + to ++   Cubital Tunnel + mild -  - - -   Flexor Wad neg ++ + ++ - -                       Date 9/24/2018 10/1/18 10/8/18 10/15/2018 10/22/18 10/26/18 1/11/19   Side Right Right Right Right Right Right Right   MEP - min + + ++ ++ +   Cubital Tunnel -         Flexor Wad + + + + + + -                         Date 2/13/19         Side Right         MEP -         Cubital Tunnel          Flexor Wad min                                 Resisted Testing 5/17/18 8/21/18 9/10/18 9/24/18 10/1/18 10/15/18 10/22/18 10/26/18 1/11/19   FCU - + + -    - -   Pronator - + + + + min + + +   Finger flexors - +  + + -   ++ + ache                             ULTT 9/10/18 9/24/18 10/1/18 10/8/18 10/22/18 2/13/2019   Ulnar ~30% NT NT NT ~50% ~80%                                          ROM: Elbow AROM (PROM)  4/24/18 Date: 4/24/18   Left Side: Right   5 Extension 5   140 Flexion 140   90 Supination 90   80 Pronation 80     ROM: Wrist AROM (PROM)  4/24 Date: 4/24 5/18 8/21 10/26   Left Side: Right Right Right Right   75 Extension 67 75 75 75 wnl   80 Flexion 75 80 77 80 wnl   25 RD 25      40 UD 37         and Pinch Strength (pounds)  8/21 Date: 8/21 10/26 1/11   Left    Side  Right Right Right   82        # 1                # 2                # 3         Average 30 to pain 70- 78-   22  3 Point  # 1               # 2               # 3        Average 16- 17- 20-   20  Lateral  # 1                # 2                # 3         Average 13+ 17- 20-     Edema:  [x]        None  []         Mild   []        Moderate  []         Severe     []         of affected part    Scar/Wound:  None    Sensation: [x]         WNL throughout all nerve distributions; per patient report    Special Tests:   Tinels over Cubital Tunnel: neg       Home Program:  Avoid underhand lifting, excessive gripping, activities that aggravate pain  OTC Wrist cock up splint for work and tasks that involve lifting/gripping  Tennis elbow strap (over flexors) as prescribed by MD prn  Self massage to FA flexors with ball   Gentle, pain free prolonged FA flexor and extensor stretches  FM to MEP  Eccentric wrist strengthening - on hold due to exacerbated symptoms   Can wean into more functional tasks being mindful of pain  Spiky ball   Ulnar nerve glide  Foam roller  Core strengthening #1  Scapular strengthening  tricep massage can  pec wall stretch (minor and major)    Next visit:   US  Ionto  STM/FM  Stretches      Please refer to the daily flowsheet for treatment today, total treatment time and time spent performing 1:1 timed codes.

## 2019-04-30 PROBLEM — M65.929 TENOSYNOVITIS OF ELBOW: Status: RESOLVED | Noted: 2018-08-28 | Resolved: 2019-04-30

## 2019-04-30 PROBLEM — M25.521 RIGHT ELBOW PAIN: Status: RESOLVED | Noted: 2018-04-24 | Resolved: 2019-04-30

## 2019-05-01 NOTE — PROGRESS NOTES
Discharge Note -Hand Therapy    Initial Evaluation Date: 5/10/18  Current Date: 4/30/2019  Number of Visits: 39    S: Patient to continue on with HEP.  Pt did not follow up for scheduled visits.    O:  Objective information is not available as pt has not returned for therapy.  Last visit or last objective information will serve as final entry.      A: Pt did not return for further treatment.    Patient is independent in home exercise program.    P:  Discharge from Hand Therapy; continue home program.

## 2019-06-17 PROBLEM — M67.89 GANGLION AND CYST OF SYNOVIUM, TENDON, AND BURSA: Status: RESOLVED | Noted: 2018-08-28 | Resolved: 2018-08-28

## 2019-06-17 PROBLEM — M71.39 GANGLION AND CYST OF SYNOVIUM, TENDON, AND BURSA: Status: RESOLVED | Noted: 2018-08-28 | Resolved: 2018-08-28

## 2019-06-17 PROBLEM — M67.49 GANGLION AND CYST OF SYNOVIUM, TENDON, AND BURSA: Status: RESOLVED | Noted: 2018-08-28 | Resolved: 2018-08-28

## 2019-07-11 PROBLEM — M77.01 MEDIAL EPICONDYLITIS OF ELBOW, RIGHT: Status: RESOLVED | Noted: 2018-11-01 | Resolved: 2019-07-11

## 2019-07-11 PROBLEM — M99.07 SOMATIC DYSFUNCTION OF UPPER EXTREMITIES: Status: RESOLVED | Noted: 2018-11-01 | Resolved: 2019-07-11

## 2019-10-21 ENCOUNTER — TRANSFERRED RECORDS (OUTPATIENT)
Dept: HEALTH INFORMATION MANAGEMENT | Facility: CLINIC | Age: 49
End: 2019-10-21

## 2019-10-25 RX ORDER — PAROXETINE 10 MG/1
15 TABLET, FILM COATED ORAL EVERY MORNING
COMMUNITY

## 2019-10-25 RX ORDER — CYCLOBENZAPRINE HCL 10 MG
10 TABLET ORAL 3 TIMES DAILY PRN
Status: ON HOLD | COMMUNITY
End: 2019-10-28

## 2019-10-28 ENCOUNTER — HOSPITAL ENCOUNTER (OUTPATIENT)
Facility: CLINIC | Age: 49
Discharge: HOME | End: 2019-10-29
Attending: ORTHOPAEDIC SURGERY | Admitting: ORTHOPAEDIC SURGERY
Payer: COMMERCIAL

## 2019-10-28 ENCOUNTER — APPOINTMENT (OUTPATIENT)
Dept: GENERAL RADIOLOGY | Facility: CLINIC | Age: 49
End: 2019-10-28
Attending: ORTHOPAEDIC SURGERY
Payer: COMMERCIAL

## 2019-10-28 ENCOUNTER — ANESTHESIA (OUTPATIENT)
Dept: SURGERY | Facility: CLINIC | Age: 49
End: 2019-10-28
Payer: COMMERCIAL

## 2019-10-28 ENCOUNTER — ANESTHESIA EVENT (OUTPATIENT)
Dept: SURGERY | Facility: CLINIC | Age: 49
End: 2019-10-28
Payer: COMMERCIAL

## 2019-10-28 DIAGNOSIS — M54.12 CERVICAL RADICULOPATHY: Primary | ICD-10-CM

## 2019-10-28 LAB — HCG UR QL: NEGATIVE

## 2019-10-28 PROCEDURE — C1713 ANCHOR/SCREW BN/BN,TIS/BN: HCPCS | Performed by: ORTHOPAEDIC SURGERY

## 2019-10-28 PROCEDURE — 25000128 H RX IP 250 OP 636: Performed by: NURSE ANESTHETIST, CERTIFIED REGISTERED

## 2019-10-28 PROCEDURE — 25000125 ZZHC RX 250: Performed by: ANESTHESIOLOGY

## 2019-10-28 PROCEDURE — 25800030 ZZH RX IP 258 OP 636: Performed by: NURSE ANESTHETIST, CERTIFIED REGISTERED

## 2019-10-28 PROCEDURE — 25000125 ZZHC RX 250: Performed by: NURSE ANESTHETIST, CERTIFIED REGISTERED

## 2019-10-28 PROCEDURE — 25000132 ZZH RX MED GY IP 250 OP 250 PS 637: Performed by: ORTHOPAEDIC SURGERY

## 2019-10-28 PROCEDURE — 72052 X-RAY EXAM NECK SPINE 6/>VWS: CPT

## 2019-10-28 PROCEDURE — 40000277 XR SURGERY CARM FLUORO LESS THAN 5 MIN W STILLS

## 2019-10-28 PROCEDURE — 25000128 H RX IP 250 OP 636: Performed by: ANESTHESIOLOGY

## 2019-10-28 PROCEDURE — 36000071 ZZH SURGERY LEVEL 5 W FLUORO 1ST 30 MIN: Performed by: ORTHOPAEDIC SURGERY

## 2019-10-28 PROCEDURE — 71000012 ZZH RECOVERY PHASE 1 LEVEL 1 FIRST HR: Performed by: ORTHOPAEDIC SURGERY

## 2019-10-28 PROCEDURE — 25000566 ZZH SEVOFLURANE, EA 15 MIN: Performed by: ORTHOPAEDIC SURGERY

## 2019-10-28 PROCEDURE — 81025 URINE PREGNANCY TEST: CPT | Performed by: ANESTHESIOLOGY

## 2019-10-28 PROCEDURE — 25800025 ZZH RX 258: Performed by: ORTHOPAEDIC SURGERY

## 2019-10-28 PROCEDURE — 27210794 ZZH OR GENERAL SUPPLY STERILE: Performed by: ORTHOPAEDIC SURGERY

## 2019-10-28 PROCEDURE — 37000008 ZZH ANESTHESIA TECHNICAL FEE, 1ST 30 MIN: Performed by: ORTHOPAEDIC SURGERY

## 2019-10-28 PROCEDURE — 37000009 ZZH ANESTHESIA TECHNICAL FEE, EACH ADDTL 15 MIN: Performed by: ORTHOPAEDIC SURGERY

## 2019-10-28 PROCEDURE — 36000069 ZZH SURGERY LEVEL 5 EA 15 ADDTL MIN: Performed by: ORTHOPAEDIC SURGERY

## 2019-10-28 PROCEDURE — 40000170 ZZH STATISTIC PRE-PROCEDURE ASSESSMENT II: Performed by: ORTHOPAEDIC SURGERY

## 2019-10-28 PROCEDURE — 25000128 H RX IP 250 OP 636: Performed by: ORTHOPAEDIC SURGERY

## 2019-10-28 PROCEDURE — C1762 CONN TISS, HUMAN(INC FASCIA): HCPCS | Performed by: ORTHOPAEDIC SURGERY

## 2019-10-28 PROCEDURE — 25000125 ZZHC RX 250: Performed by: ORTHOPAEDIC SURGERY

## 2019-10-28 DEVICE — IMP PLATE CERV MEDT ZEVO 37MM 2 LVL 3002037: Type: IMPLANTABLE DEVICE | Site: CERVIX | Status: FUNCTIONAL

## 2019-10-28 DEVICE — IMPLANTABLE DEVICE: Type: IMPLANTABLE DEVICE | Site: CERVIX | Status: FUNCTIONAL

## 2019-10-28 DEVICE — IMP SCR MEDT ZEVO 3.5X11MM ST VA 7723511: Type: IMPLANTABLE DEVICE | Site: CERVIX | Status: FUNCTIONAL

## 2019-10-28 RX ORDER — NALOXONE HYDROCHLORIDE 0.4 MG/ML
.1-.4 INJECTION, SOLUTION INTRAMUSCULAR; INTRAVENOUS; SUBCUTANEOUS
Status: DISCONTINUED | OUTPATIENT
Start: 2019-10-28 | End: 2019-10-29 | Stop reason: HOSPADM

## 2019-10-28 RX ORDER — RIZATRIPTAN BENZOATE 10 MG/1
10 TABLET, ORALLY DISINTEGRATING ORAL
Status: DISCONTINUED | OUTPATIENT
Start: 2019-10-28 | End: 2019-10-29 | Stop reason: HOSPADM

## 2019-10-28 RX ORDER — METOCLOPRAMIDE 10 MG/1
10 TABLET ORAL EVERY 6 HOURS PRN
Status: DISCONTINUED | OUTPATIENT
Start: 2019-10-28 | End: 2019-10-29 | Stop reason: HOSPADM

## 2019-10-28 RX ORDER — GLYCOPYRROLATE 0.2 MG/ML
INJECTION, SOLUTION INTRAMUSCULAR; INTRAVENOUS PRN
Status: DISCONTINUED | OUTPATIENT
Start: 2019-10-28 | End: 2019-10-28

## 2019-10-28 RX ORDER — DIPHENHYDRAMINE HYDROCHLORIDE 50 MG/ML
INJECTION INTRAMUSCULAR; INTRAVENOUS PRN
Status: DISCONTINUED | OUTPATIENT
Start: 2019-10-28 | End: 2019-10-28

## 2019-10-28 RX ORDER — HYDROMORPHONE HYDROCHLORIDE 1 MG/ML
0.2 INJECTION, SOLUTION INTRAMUSCULAR; INTRAVENOUS; SUBCUTANEOUS
Status: DISCONTINUED | OUTPATIENT
Start: 2019-10-28 | End: 2019-10-29 | Stop reason: HOSPADM

## 2019-10-28 RX ORDER — SODIUM CHLORIDE, SODIUM LACTATE, POTASSIUM CHLORIDE, CALCIUM CHLORIDE 600; 310; 30; 20 MG/100ML; MG/100ML; MG/100ML; MG/100ML
INJECTION, SOLUTION INTRAVENOUS CONTINUOUS
Status: DISCONTINUED | OUTPATIENT
Start: 2019-10-28 | End: 2019-10-28 | Stop reason: HOSPADM

## 2019-10-28 RX ORDER — ONDANSETRON 2 MG/ML
4 INJECTION INTRAMUSCULAR; INTRAVENOUS EVERY 6 HOURS PRN
Status: DISCONTINUED | OUTPATIENT
Start: 2019-10-28 | End: 2019-10-29 | Stop reason: HOSPADM

## 2019-10-28 RX ORDER — CEFAZOLIN SODIUM 1 G/3ML
1 INJECTION, POWDER, FOR SOLUTION INTRAMUSCULAR; INTRAVENOUS SEE ADMIN INSTRUCTIONS
Status: DISCONTINUED | OUTPATIENT
Start: 2019-10-28 | End: 2019-10-28 | Stop reason: HOSPADM

## 2019-10-28 RX ORDER — NALOXONE HYDROCHLORIDE 0.4 MG/ML
.1-.4 INJECTION, SOLUTION INTRAMUSCULAR; INTRAVENOUS; SUBCUTANEOUS
Status: DISCONTINUED | OUTPATIENT
Start: 2019-10-28 | End: 2019-10-28

## 2019-10-28 RX ORDER — ACETAMINOPHEN 325 MG/1
975 TABLET ORAL ONCE
Status: DISCONTINUED | OUTPATIENT
Start: 2019-10-28 | End: 2019-10-28 | Stop reason: HOSPADM

## 2019-10-28 RX ORDER — ACETAMINOPHEN 325 MG/1
650 TABLET ORAL EVERY 6 HOURS PRN
Status: DISCONTINUED | OUTPATIENT
Start: 2019-10-28 | End: 2019-10-29 | Stop reason: HOSPADM

## 2019-10-28 RX ORDER — ACETAMINOPHEN 325 MG/1
975 TABLET ORAL ONCE
Status: COMPLETED | OUTPATIENT
Start: 2019-10-28 | End: 2019-10-28

## 2019-10-28 RX ORDER — OXYCODONE HYDROCHLORIDE 5 MG/1
5-10 TABLET ORAL
Status: DISCONTINUED | OUTPATIENT
Start: 2019-10-28 | End: 2019-10-29 | Stop reason: HOSPADM

## 2019-10-28 RX ORDER — METOCLOPRAMIDE HYDROCHLORIDE 5 MG/ML
10 INJECTION INTRAMUSCULAR; INTRAVENOUS EVERY 6 HOURS PRN
Status: DISCONTINUED | OUTPATIENT
Start: 2019-10-28 | End: 2019-10-29 | Stop reason: HOSPADM

## 2019-10-28 RX ORDER — FENTANYL CITRATE 50 UG/ML
50-100 INJECTION, SOLUTION INTRAMUSCULAR; INTRAVENOUS
Status: DISCONTINUED | OUTPATIENT
Start: 2019-10-28 | End: 2019-10-28 | Stop reason: HOSPADM

## 2019-10-28 RX ORDER — CHOLECALCIFEROL (VITAMIN D3) 50 MCG
2000 TABLET ORAL DAILY
Status: DISCONTINUED | OUTPATIENT
Start: 2019-10-28 | End: 2019-10-29 | Stop reason: HOSPADM

## 2019-10-28 RX ORDER — OXYCODONE AND ACETAMINOPHEN 5; 325 MG/1; MG/1
1 TABLET ORAL EVERY 4 HOURS PRN
Status: DISCONTINUED | OUTPATIENT
Start: 2019-10-28 | End: 2019-10-29 | Stop reason: HOSPADM

## 2019-10-28 RX ORDER — FENTANYL CITRATE 50 UG/ML
25-50 INJECTION, SOLUTION INTRAMUSCULAR; INTRAVENOUS
Status: DISCONTINUED | OUTPATIENT
Start: 2019-10-28 | End: 2019-10-28 | Stop reason: HOSPADM

## 2019-10-28 RX ORDER — PROPOFOL 10 MG/ML
INJECTION, EMULSION INTRAVENOUS PRN
Status: DISCONTINUED | OUTPATIENT
Start: 2019-10-28 | End: 2019-10-28

## 2019-10-28 RX ORDER — NEOSTIGMINE METHYLSULFATE 1 MG/ML
VIAL (ML) INJECTION PRN
Status: DISCONTINUED | OUTPATIENT
Start: 2019-10-28 | End: 2019-10-28

## 2019-10-28 RX ORDER — RIZATRIPTAN BENZOATE 10 MG/1
10 TABLET, ORALLY DISINTEGRATING ORAL
COMMUNITY

## 2019-10-28 RX ORDER — DIAZEPAM 5 MG
5 TABLET ORAL EVERY 8 HOURS PRN
Status: DISCONTINUED | OUTPATIENT
Start: 2019-10-28 | End: 2019-10-29 | Stop reason: HOSPADM

## 2019-10-28 RX ORDER — EPHEDRINE SULFATE 50 MG/ML
INJECTION, SOLUTION INTRAMUSCULAR; INTRAVENOUS; SUBCUTANEOUS PRN
Status: DISCONTINUED | OUTPATIENT
Start: 2019-10-28 | End: 2019-10-28

## 2019-10-28 RX ORDER — ONDANSETRON 2 MG/ML
INJECTION INTRAMUSCULAR; INTRAVENOUS PRN
Status: DISCONTINUED | OUTPATIENT
Start: 2019-10-28 | End: 2019-10-28

## 2019-10-28 RX ORDER — GABAPENTIN 300 MG/1
300 CAPSULE ORAL ONCE
Status: DISCONTINUED | OUTPATIENT
Start: 2019-10-28 | End: 2019-10-28 | Stop reason: HOSPADM

## 2019-10-28 RX ORDER — SODIUM CHLORIDE, SODIUM LACTATE, POTASSIUM CHLORIDE, CALCIUM CHLORIDE 600; 310; 30; 20 MG/100ML; MG/100ML; MG/100ML; MG/100ML
INJECTION, SOLUTION INTRAVENOUS CONTINUOUS PRN
Status: DISCONTINUED | OUTPATIENT
Start: 2019-10-28 | End: 2019-10-28

## 2019-10-28 RX ORDER — CEFAZOLIN SODIUM 1 G/3ML
1 INJECTION, POWDER, FOR SOLUTION INTRAMUSCULAR; INTRAVENOUS EVERY 8 HOURS
Status: DISCONTINUED | OUTPATIENT
Start: 2019-10-28 | End: 2019-10-29 | Stop reason: HOSPADM

## 2019-10-28 RX ORDER — HYDROMORPHONE HYDROCHLORIDE 1 MG/ML
.3-.5 INJECTION, SOLUTION INTRAMUSCULAR; INTRAVENOUS; SUBCUTANEOUS EVERY 5 MIN PRN
Status: DISCONTINUED | OUTPATIENT
Start: 2019-10-28 | End: 2019-10-28 | Stop reason: HOSPADM

## 2019-10-28 RX ORDER — ONDANSETRON 4 MG/1
4 TABLET, ORALLY DISINTEGRATING ORAL EVERY 30 MIN PRN
Status: DISCONTINUED | OUTPATIENT
Start: 2019-10-28 | End: 2019-10-28 | Stop reason: HOSPADM

## 2019-10-28 RX ORDER — SCOLOPAMINE TRANSDERMAL SYSTEM 1 MG/1
1 PATCH, EXTENDED RELEASE TRANSDERMAL
Status: DISCONTINUED | OUTPATIENT
Start: 2019-10-28 | End: 2019-10-29 | Stop reason: HOSPADM

## 2019-10-28 RX ORDER — PROCHLORPERAZINE MALEATE 5 MG
10 TABLET ORAL EVERY 6 HOURS PRN
Status: DISCONTINUED | OUTPATIENT
Start: 2019-10-28 | End: 2019-10-29 | Stop reason: HOSPADM

## 2019-10-28 RX ORDER — GABAPENTIN 300 MG/1
300 CAPSULE ORAL
Status: COMPLETED | OUTPATIENT
Start: 2019-10-28 | End: 2019-10-28

## 2019-10-28 RX ORDER — ONDANSETRON 4 MG/1
4 TABLET, ORALLY DISINTEGRATING ORAL EVERY 6 HOURS PRN
Status: DISCONTINUED | OUTPATIENT
Start: 2019-10-28 | End: 2019-10-29 | Stop reason: HOSPADM

## 2019-10-28 RX ORDER — LIDOCAINE 40 MG/G
CREAM TOPICAL
Status: DISCONTINUED | OUTPATIENT
Start: 2019-10-28 | End: 2019-10-29 | Stop reason: HOSPADM

## 2019-10-28 RX ORDER — CEFAZOLIN SODIUM 2 G/100ML
2 INJECTION, SOLUTION INTRAVENOUS
Status: COMPLETED | OUTPATIENT
Start: 2019-10-28 | End: 2019-10-28

## 2019-10-28 RX ORDER — ONDANSETRON 2 MG/ML
4 INJECTION INTRAMUSCULAR; INTRAVENOUS EVERY 30 MIN PRN
Status: DISCONTINUED | OUTPATIENT
Start: 2019-10-28 | End: 2019-10-28 | Stop reason: HOSPADM

## 2019-10-28 RX ORDER — LIDOCAINE HYDROCHLORIDE 20 MG/ML
INJECTION, SOLUTION INFILTRATION; PERINEURAL PRN
Status: DISCONTINUED | OUTPATIENT
Start: 2019-10-28 | End: 2019-10-28

## 2019-10-28 RX ORDER — CHOLECALCIFEROL (VITAMIN D3) 50 MCG
1 TABLET ORAL DAILY
COMMUNITY

## 2019-10-28 RX ADMIN — MIDAZOLAM 2 MG: 1 INJECTION INTRAMUSCULAR; INTRAVENOUS at 07:30

## 2019-10-28 RX ADMIN — HYDROMORPHONE HYDROCHLORIDE 0.2 MG: 1 INJECTION, SOLUTION INTRAMUSCULAR; INTRAVENOUS; SUBCUTANEOUS at 11:14

## 2019-10-28 RX ADMIN — LIDOCAINE HYDROCHLORIDE 100 MG: 20 INJECTION, SOLUTION INFILTRATION; PERINEURAL at 07:39

## 2019-10-28 RX ADMIN — GABAPENTIN 300 MG: 300 CAPSULE ORAL at 06:33

## 2019-10-28 RX ADMIN — FENTANYL CITRATE 100 MCG: 50 INJECTION, SOLUTION INTRAMUSCULAR; INTRAVENOUS at 07:39

## 2019-10-28 RX ADMIN — SODIUM CHLORIDE, SODIUM LACTATE, POTASSIUM CHLORIDE, CALCIUM CHLORIDE: 600; 310; 30; 20 INJECTION, SOLUTION INTRAVENOUS at 07:31

## 2019-10-28 RX ADMIN — Medication 5 MG: at 07:49

## 2019-10-28 RX ADMIN — SCOPALAMINE 1 PATCH: 1 PATCH, EXTENDED RELEASE TRANSDERMAL at 07:19

## 2019-10-28 RX ADMIN — ACETAMINOPHEN 975 MG: 325 TABLET, FILM COATED ORAL at 06:32

## 2019-10-28 RX ADMIN — Medication 5 MG: at 07:53

## 2019-10-28 RX ADMIN — Medication 10 MG: at 08:12

## 2019-10-28 RX ADMIN — Medication 5 MG: at 08:09

## 2019-10-28 RX ADMIN — Medication 5 MG: at 07:51

## 2019-10-28 RX ADMIN — DEXTROSE AND SODIUM CHLORIDE: 5; 450 INJECTION, SOLUTION INTRAVENOUS at 12:16

## 2019-10-28 RX ADMIN — ACETAMINOPHEN 650 MG: 325 TABLET, FILM COATED ORAL at 17:41

## 2019-10-28 RX ADMIN — PHENYLEPHRINE HYDROCHLORIDE 50 MCG: 10 INJECTION INTRAVENOUS at 09:09

## 2019-10-28 RX ADMIN — DEXTROSE AND SODIUM CHLORIDE: 5; 450 INJECTION, SOLUTION INTRAVENOUS at 22:07

## 2019-10-28 RX ADMIN — HYDROMORPHONE HYDROCHLORIDE 0.2 MG: 1 INJECTION, SOLUTION INTRAMUSCULAR; INTRAVENOUS; SUBCUTANEOUS at 15:03

## 2019-10-28 RX ADMIN — CEFAZOLIN 1 G: 1 INJECTION, POWDER, FOR SOLUTION INTRAMUSCULAR; INTRAVENOUS at 16:43

## 2019-10-28 RX ADMIN — ONDANSETRON 4 MG: 4 TABLET, ORALLY DISINTEGRATING ORAL at 17:41

## 2019-10-28 RX ADMIN — NEOSTIGMINE METHYLSULFATE 3.5 MG: 1 INJECTION, SOLUTION INTRAVENOUS at 09:19

## 2019-10-28 RX ADMIN — PROPOFOL 200 MG: 10 INJECTION, EMULSION INTRAVENOUS at 07:39

## 2019-10-28 RX ADMIN — SODIUM CHLORIDE, SODIUM LACTATE, POTASSIUM CHLORIDE, CALCIUM CHLORIDE: 600; 310; 30; 20 INJECTION, SOLUTION INTRAVENOUS at 09:03

## 2019-10-28 RX ADMIN — CEFAZOLIN SODIUM 2 G: 2 INJECTION, SOLUTION INTRAVENOUS at 07:43

## 2019-10-28 RX ADMIN — Medication 5 MG: at 07:56

## 2019-10-28 RX ADMIN — ROCURONIUM BROMIDE 50 MG: 10 INJECTION INTRAVENOUS at 07:39

## 2019-10-28 RX ADMIN — HYDROMORPHONE HYDROCHLORIDE 0.5 MG: 1 INJECTION, SOLUTION INTRAMUSCULAR; INTRAVENOUS; SUBCUTANEOUS at 08:01

## 2019-10-28 RX ADMIN — GLYCOPYRROLATE 0.5 MG: 0.2 INJECTION, SOLUTION INTRAMUSCULAR; INTRAVENOUS at 09:19

## 2019-10-28 RX ADMIN — DIPHENHYDRAMINE HYDROCHLORIDE 12.5 MG: 50 INJECTION, SOLUTION INTRAMUSCULAR; INTRAVENOUS at 07:53

## 2019-10-28 RX ADMIN — Medication 5 MG: at 08:17

## 2019-10-28 RX ADMIN — ONDANSETRON 4 MG: 2 INJECTION INTRAMUSCULAR; INTRAVENOUS at 09:14

## 2019-10-28 RX ADMIN — PHENYLEPHRINE HYDROCHLORIDE 100 MCG: 10 INJECTION INTRAVENOUS at 07:45

## 2019-10-28 RX ADMIN — PHENYLEPHRINE HYDROCHLORIDE 50 MCG: 10 INJECTION INTRAVENOUS at 08:26

## 2019-10-28 ASSESSMENT — COPD QUESTIONNAIRES: COPD: 0

## 2019-10-28 ASSESSMENT — MIFFLIN-ST. JEOR: SCORE: 1507.68

## 2019-10-28 ASSESSMENT — LIFESTYLE VARIABLES: TOBACCO_USE: 0

## 2019-10-28 ASSESSMENT — ENCOUNTER SYMPTOMS: SEIZURES: 0

## 2019-10-28 NOTE — PROGRESS NOTES
Admission medication history interview status for the 10/28/2019  admission is complete. See EPIC admission navigator for prior to admission medications     Medication history source reliability:Good    Medication history interview source(s):Patient    Medication history resources (including written lists, pill bottles, clinic record):None    Primary pharmacy.[facility]    Additional medication history information not noted on PTA med list :none    Time spent in this activity:30 minutes    Prior to Admission medications    Medication Sig Last Dose Taking? Auth Provider   Omega-3 Fatty Acids (FISH OIL PO) Take  by mouth. 10/22/2019 at 0700 Yes Reported, Patient   PARoxetine (PAXIL) 10 MG tablet Take 15 mg by mouth every morning (Patient takes 1.5 tablets=15mg) 10/28/2019 at 0415 Yes Reported, Patient   rizatriptan (MAXALT-MLT) 10 MG ODT Take 10 mg by mouth at onset of headache for migraine 10/24/2019 at 0800 Yes Reported, Patient   vitamin D3 (CHOLECALCIFEROL) 2000 units (50 mcg) tablet Take 1 tablet by mouth daily 10/22/2019 at 0700 Yes Reported, Patient

## 2019-10-28 NOTE — ANESTHESIA CARE TRANSFER NOTE
Patient: Lucero Cedeno    Procedure(s):  ANTERIOR CERVICAL DECOMPRESSION AND FUSION C5-6 AND C6-7, BONE MARROW ASPIRATION LEFT ILIAC CREST - MEDTRONIC    Diagnosis: * No pre-op diagnosis entered *  Diagnosis Additional Information: No value filed.    Anesthesia Type:   General, ETT     Note:  Airway :Face Mask  Patient transferred to:PACU  Comments: Spont. Resps, pt. Responding.  Extubated, sufficient air exchange. To PACU VSS, Monitors on. Report to RNHandoff Report: Identifed the Patient, Identified the Reponsible Provider, Reviewed the pertinent medical history, Discussed the surgical course, Reviewed Intra-OP anesthesia mangement and issues during anesthesia, Set expectations for post-procedure period and Allowed opportunity for questions and acknowledgement of understanding      Vitals: (Last set prior to Anesthesia Care Transfer)    CRNA VITALS  10/28/2019 0909 - 10/28/2019 0946      10/28/2019             Pulse:  108    SpO2:  100 %    Resp Rate (set):  10                Electronically Signed By: KAREN Hester CRNA  October 28, 2019  9:46 AM

## 2019-10-28 NOTE — OP NOTE
Procedure Date: 10/28/2019      SURGEON:  Hong Cornell MD.        FIRST ASSISTANT:  MEAGAN Kyle.       PREOPERATIVE DIAGNOSIS:  Cervical radiculopathy, profound.      POSTOPERATIVE DIAGNOSIS:  Cervical radiculopathy, profound.      PROCEDURE PERFORMED:  Anterior diskectomy and fusion, C5-C6, C6-C7, and left iliac crest bone marrow aspiration      PREOPERATIVE INDICATIONS:  This 49-year-old woman presents with acute severe and unrelenting left classic C7 radiculopathy secondary to obvious disk herniation at C6-C7 and degenerative foraminal stenosis at C5-C6.  Due to obvious ongoing triceps, pectoralis, and pronator weakness associated with unrelenting pain, she opted for operative management.      DETAILS OF PROCEDURE:  The patient was taken to the operating room, given a satisfactory general endotracheal anesthetic, was appropriately positioned, prepared and draped for anterior cervical surgery.  Great care was used to maintain the neck well within the demonstrated preoperative range of motion.  We objectively adhered to these positioning criteria.  We carefully padded and protected all neurovascular prominences.  The anterior aspect of the neck, as well as the left iliac crest, was widely aseptically prepped and draped.  Standard timeout was accomplished.  Preincisional fluoroscopy was used to localize the incision.      Standard timeout was accomplished.  A transverse incision was fashioned approximately 4 cm in length.  Skin, subcutaneous tissue and platysma were divided transversely.  The anterior border of the sternocleidomastoid mobilized proximally, distally, and released.  The middle layer bluntly dissected.  Great care was used in a digital dissection just medial to the common carotid neurovascular structure in order to avoid the tracheoesophageal groove and recurrent laryngeal nerve, neither of which were seen or sought after.  Pretracheal and prevertebral fascia were bluntly split, thus  exposing the anterior spine.  Two independent observers reached the same conclusion regarding operative level, and C6-C7 was then approached first.  Careful symmetric bilateral subperiosteal reflection of the longus colli thus ensued.  Self-retaining retractors were placed.  Up-down exposure was maintained with a [place] pin post device.  Minimal anterior osteophytes were present, and a piecemeal diskectomy down the posterior longitudinal ligament was carried out.  Motorized dissection was used to open the disk space in a parallel fashion in order to improve operative exposure.  Posterior longitudinal ligament was identified, thinned and removed, and a side-to-side resection and exploration of the epidural space was performed.  We identified the takeoffs of the right C7 nerve root.  There was minimal extrinsic pressure off to the left, there was a chronic posterolateral and foraminal disk herniation marked clearly impinging the take-off of the C7 nerve root.  Numerous small fragments were extracted and, at the completion, after partial uncovertebral resection, the takeoff of the nerve root was identified.  We visualized about 6 mm to 7 mm of nerve and what was palpated beyond that appeared to be free.  The decompression was performed without incident.  There was a fairly impressive number of small extruded fragments for which visible and easily reachable fragments were removed.  At the termination of decompression, the dura was flat and there was no obvious extrinsic pressure.  Flat bleeding bone surfaces were generated.  A 7 mm graft had the best fit, feel, and preload.  Sizers were used to carefully and determined the preload.  A 2 mL aliquot of bone marrow aspirate was harvested from the left anterior superior iliac spine suitably distal to the ASIS from a unicortical laterally based puncture.  Then, 2 mL were withdrawn and used to soak the graft, and graft inserted with good fit and feel.      We then turned our  attention via careful fascial dissection up to C5-C6 where a similar decompression was carried out.  There the findings were notable for more chronic spondylosis and foraminal narrowing, but no obvious disk extrusion.  Similar decompression and fusion was carried out.  This disk space was fairly indistractable and quite stiff.      A 37 mm Medtronic Zevo plate had good anterior cortical apposition.  Incremental insertion of 11 mm purchase length screws ensued.  Good fixation was achieved.  Locking mechanisms were deployed per 's instructions.      The posterior aspect of the esophagus was inspected.  It was clean and dry.  There was no evidence of trauma, tear or leakage.  Indigo carmine was instilled.  There was no evidence of leakage.  A layered anatomical closure and suction drain consisting of 3-0 platysma the subcutaneous tissue and 5-0 Prolene skin completed the operation.  Blood loss was less than 10 mL.  Sponge and needle count correct.  There were no intraoperative or technical complications.  Everything went quite smoothly.      FINAL CLINICAL DIAGNOSIS:  Obvious disk herniation C6-C7 and foraminal stenosis C4-C5, requiring 2-level decompression and fusion.        Nurse Joana Gómez was in attendance at all times.  She was critical to the safe and efficient performance of this procedure.  Her tasks included protection, retraction and mobilization of neurovascular and visceral structures, and she was essential to the safe and timely performance of this procedure.         BRIAN HERNANDEZ MD             D: 10/28/2019   T: 10/28/2019   MT: NATALIE      Name:     SOHA LOVE   MRN:      [id number]        Account:        [id number]   :      1970           Procedure Date: 10/28/2019      Document: [id number]

## 2019-10-28 NOTE — ANESTHESIA PREPROCEDURE EVALUATION
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 Pt has had prior anesthetic. Type: General    No history of anesthetic complications          ROS/MED HX    ENT/Pulmonary:      (-) tobacco use, asthma, COPD and sleep apnea   Neurologic:     (+)neuropathy - thoracic radiculopathy,    (-) seizures and CVA   Cardiovascular:        (-) hypertension, CAD and CHF   METS/Exercise Tolerance:  >4 METS   Hematologic:  - neg hematologic  ROS       Musculoskeletal:   (+) arthritis,  -       GI/Hepatic:        (-) GERD and liver disease   Renal/Genitourinary:      (-) renal disease   Endo:      (-) Type I DM and Type II DM   Psychiatric:     (+) psychiatric history depression      Infectious Disease:         Malignancy:         Other:                          Physical Exam  Normal systems: cardiovascular and pulmonary    Airway   Mallampati: II  TM distance: >3 FB  Neck ROM: full    Dental   (+) caps  Comment: Lower incisor crown. Otherwise solid    Cardiovascular       Pulmonary             No results found for: WBC, HGB, HCT, PLT, CRP, SED, NA, POTASSIUM, CHLORIDE, CO2, BUN, CR, GLC, SIN, PHOS, MAG, ALBUMIN, PROTTOTAL, ALT, AST, GGT, ALKPHOS, BILITOTAL, BILIDIRECT, LIPASE, AMYLASE, MARYLOU, PTT, INR, FIBR, TSH,  "T4, T3, HCG, HCGS, CKTOTAL, CKMB, TROPN    Preop Vitals  BP Readings from Last 3 Encounters:   10/28/19 115/78   06/21/11 115/75    Pulse Readings from Last 3 Encounters:   06/21/11 105      Resp Readings from Last 3 Encounters:   10/28/19 16    SpO2 Readings from Last 3 Encounters:   10/28/19 99%   06/21/11 98%      Temp Readings from Last 1 Encounters:   10/28/19 35.9  C (96.7  F) (Temporal)    Ht Readings from Last 1 Encounters:   10/28/19 1.778 m (5' 10\")      Wt Readings from Last 1 Encounters:   10/28/19 79.2 kg (174 lb 11.2 oz)    Estimated body mass index is 25.07 kg/m  as calculated from the following:    Height as of this encounter: 1.778 m (5' 10\").    Weight as of this encounter: 79.2 kg (174 lb 11.2 oz).       Anesthesia Plan      History & Physical Review  History and physical reviewed and following examination; no interval change.    ASA Status:  1 .    NPO Status:  > 8 hours    Plan for General and ETT with Intravenous induction. Maintenance will be Balanced.    PONV prophylaxis:  Ondansetron (or other 5HT-3)  Diphenhydramine 12.5 mg IV  Soft bite block on wake up      Postoperative Care  Postoperative pain management:  Multi-modal analgesia.  Plan for postoperative opioid use.    Consents  Anesthetic plan, risks, benefits and alternatives discussed with:  Patient..                 Edgar Contreras MD  "

## 2019-10-28 NOTE — ANESTHESIA POSTPROCEDURE EVALUATION
Patient: Lucero Cedeno    Procedure(s):  ANTERIOR CERVICAL DECOMPRESSION AND FUSION C5-6 AND C6-7, BONE MARROW ASPIRATION LEFT ILIAC CREST - MEDTRONIC    Diagnosis:* No pre-op diagnosis entered *  Diagnosis Additional Information: No value filed.    Anesthesia Type:  General, ETT    Note:  Anesthesia Post Evaluation    Patient location during evaluation: PACU  Patient participation: Able to fully participate in evaluation  Level of consciousness: awake  Pain management: adequate  Airway patency: patent  Cardiovascular status: acceptable  Respiratory status: acceptable  Hydration status: acceptable  PONV: controlled     Anesthetic complications: None          Last vitals:  Vitals:    10/28/19 1220 10/28/19 1306 10/28/19 1514   BP: 116/66 112/70    Pulse: 68     Resp: 16 16 16   Temp:  36.8  C (98.3  F)    SpO2: 97% 99% 95%         Electronically Signed By: Joycelyn Silver MD  October 28, 2019  3:30 PM

## 2019-10-28 NOTE — PLAN OF CARE
ANTERIOR CERVICAL DECOMPRESSION AND FUSION C5-6 AND C6-7 & left iliac crest. Arrived to [facility] from PACU approximately 1145.  A&O. CMS - numbness/tingling left upper extremity improving (now present in fingers only).  Bowel sounds hypoactive/no flatus/bm priro to surgery.  Tolerating water & ice chips/some nausea with first ambulation - improved/full liquid diet ordered.  Scopolamine patch behind left ear intact.  VSS, titrated to room air oxygenation/no shortness of breath noted. Anterior neck incision clean/dry/intact.  СЕРГЕЙ drain patent/blood drainage emptied. Up with 1 assist/gait belt to bathroom/steady & no dizziness. C/o pain when swallows, decreased with dilaudid IV.

## 2019-10-28 NOTE — BRIEF OP NOTE
[facility]    Brief Operative Note    Pre-operative diagnosis: Cervical radiculopathy   Post-operative diagnosis same    Procedure: Procedure(s):  ANTERIOR CERVICAL DECOMPRESSION AND FUSION C5-6 AND C6-7, BONE MARROW ASPIRATION LEFT ILIAC CREST - MEDTRONIC  Surgeon: Surgeon(s) and Role:     * Hong Cornell MD - Primary  Anesthesia: General   Estimated blood loss: 10  Drains: Hemovac  Specimens: * No specimens in log *  Findings:   None.  Complications: None.  Implants:   Implant Name Type Inv. Item Serial No.  Lot No. LRB No. Used   GRAFT BONE CORNERSTONE ASR [id number] FD Bone/Tissue/Biologic GRAFT BONE CORNERSTONE ASR [id number] FD [id number] [employer] [id number] N/A 1   IMP SCR MEDT ZEVO 3.5X11MM ST [place] [id number] Metallic Hardware/[place] IMP SCR MEDT ZEVO 3.5X11MM ST VA [id number]  MEDTRONIC INC 41 04 24OCT 2019 N/A 6   GRAFT BONE CORNERSTONE ASR [id number] FD Bone/Tissue/Biologic GRAFT BONE CORNERSTONE ASR [id number] FD [id number] [employer] [id number] N/A 1   IMP PLATE CERV MEDT ZEVO 37MM 2 LVL [id number] Metallic Hardware/[place] IMP PLATE CERV MEDT ZEVO 37MM 2 LVL [id number]  [employer]TRONIC INC 41 04 24OCT 2019 N/A 1

## 2019-10-29 VITALS
RESPIRATION RATE: 16 BRPM | WEIGHT: 174.7 LBS | BODY MASS INDEX: 25.01 KG/M2 | HEART RATE: 68 BPM | SYSTOLIC BLOOD PRESSURE: 94 MMHG | DIASTOLIC BLOOD PRESSURE: 53 MMHG | TEMPERATURE: 99.6 F | OXYGEN SATURATION: 96 % | HEIGHT: 70 IN

## 2019-10-29 LAB — GLUCOSE BLDC GLUCOMTR-MCNC: 128 MG/DL (ref 70–99)

## 2019-10-29 PROCEDURE — 82962 GLUCOSE BLOOD TEST: CPT

## 2019-10-29 PROCEDURE — 25000132 ZZH RX MED GY IP 250 OP 250 PS 637: Performed by: ORTHOPAEDIC SURGERY

## 2019-10-29 PROCEDURE — 25000128 H RX IP 250 OP 636: Performed by: ORTHOPAEDIC SURGERY

## 2019-10-29 RX ORDER — OXYCODONE AND ACETAMINOPHEN 5; 325 MG/1; MG/1
1-2 TABLET ORAL EVERY 4 HOURS PRN
Qty: 40 TABLET | Refills: 0 | Status: SHIPPED | OUTPATIENT
Start: 2019-10-29

## 2019-10-29 RX ORDER — DIAZEPAM 5 MG
5 TABLET ORAL EVERY 8 HOURS PRN
Qty: 30 TABLET | Refills: 0 | Status: SHIPPED | OUTPATIENT
Start: 2019-10-29

## 2019-10-29 RX ADMIN — CHOLECALCIFEROL TAB 50 MCG (2000 UNIT) 2000 UNITS: 50 TAB at 08:53

## 2019-10-29 RX ADMIN — OXYCODONE HYDROCHLORIDE 5 MG: 5 TABLET ORAL at 03:35

## 2019-10-29 RX ADMIN — CEFAZOLIN 1 G: 1 INJECTION, POWDER, FOR SOLUTION INTRAMUSCULAR; INTRAVENOUS at 00:09

## 2019-10-29 RX ADMIN — ONDANSETRON 4 MG: 4 TABLET, ORALLY DISINTEGRATING ORAL at 08:53

## 2019-10-29 RX ADMIN — ACETAMINOPHEN 650 MG: 325 TABLET, FILM COATED ORAL at 10:51

## 2019-10-29 RX ADMIN — PAROXETINE 15 MG: 10 TABLET, FILM COATED ORAL at 08:53

## 2019-10-29 RX ADMIN — CEFAZOLIN 1 G: 1 INJECTION, POWDER, FOR SOLUTION INTRAMUSCULAR; INTRAVENOUS at 08:53

## 2019-10-29 RX ADMIN — OXYCODONE HYDROCHLORIDE 5 MG: 5 TABLET ORAL at 00:13

## 2019-10-29 NOTE — PROGRESS NOTES
POD #1   Doing quite well.   Neuro intact, voice ok, swallowing ok  No arm pain, good strength   Drain typical   Plan- mobilization, transition po pain medication, PT,   discharge likely today    Hong Cornell MD

## 2019-10-29 NOTE — DISCHARGE INSTRUCTIONS
Discharge Instructions following Cervical Spine Surgery      Wound Care:    Your incision(s) are closed with a suture that will need to be removed.  You will have steri strips (butterfly tapes) across your incision(s).  Leave the steri strips in place until you come to the office to have your sutures removed.  You will need to make an appointment for 1 to 2 weeks following your surgery to have your suture(s) removed and to have an x-ray check.  Please call [phone] to make an appointment if you haven t already done so.      You do not need to cover your incisions with plastic when you are showering, once you are home from the hospital.  Let the water run over your incision when in the shower, then pat the incision dry.  Cover your incision with a clean bandage after you shower to keep you incision clean until your sutures are removed.    If you had bone graft taken from your iliac crest for your procedure, do not take a bath or sit in a hot tub until your sutures are removed and your incision is well healed.    Pain Medication:    You will be sent home with pain medication from the hospital.  Gradually decrease your usage of the pain medication as you start to feel better.  If you are in need of a refill of pain medication, please call the office and talk to Joana.  Please plan ahead and call during regular office hours if you need a refill.  Our office hours are Monday through Friday from 8:00 am until 4:30 pm.  Pain medication cannot be refilled in the evening or on weekends.  Narcotic pain medication is addictive and will not be prescribed on a long term basis.  Joana will be able to assist you in weaning from these medications.    Avoid the use of any non-steroidal anti-inflammatory medications (Advil, Aleve, Celebrex etc.) for 6 weeks after your surgery.  These medications inhibit bone growth and Dr. Cornell would prefer if you would avoid these medications.  If you do not need narcotic pain medication,  Tylenol products would be a good alternative for mild pain control.      Activity:    You have a 20 pound lifting restriction for at least 6 weeks.  Dr. Cornell will inform you at your 6-week follow-up appointment if you can increase your weight limit.      Walking is your best activity.  Remember to walk daily and increase your distance and time as tolerated. Continue the daily isometric exercises that you were taught by the physical therapist while you were in the hospital.  This will help with the stiffness caused by the brace.    You cannot drive if you have taken narcotic pain medication within eight hours.  You may resume driving once you are not taking narcotic pain medication and you feel comfortable to operate your vehicle safely.    If you have any questions or concerns once you are home from the hospital, please call the office and speak with Dr. Cornell s nurse, Joana.  She can be reached at [phone].

## 2019-10-29 NOTE — PLAN OF CARE
A&OX4, VSS on RA. POD 0. C/o throat pain and HA managed with tylenol and ice packs. Dressing on the anterior neck dry and intact except for some dried drainage. СЕРГЕЙ drain with serosanguinous output.Up SBA. IVF at 100mL/hr. Scopalamine patch in place. On Full liquid diet. Can advance to regular for breakfast. One episode of emesis. Zofran given was effective. CMS intact except for some baseline numbness and tingling in the LUE.Continue to monitor.

## 2019-10-29 NOTE — PLAN OF CARE
POD 1 from a cervical decompression/fusion. A&O x4. CMS with numbness/tingling in L fingers, otherwise intact. Bowel sounds active, positive flatus, will try regular diet today. No c/o nausea tonight. VSS. Dressing with small amt dried drainage. СЕРГЕЙ with minimal drainage. Up with SBA. Voiding adequately. Given prn oxycodone and ice packs for neck pain and headache. Pt scoring green on the Aggression Stop Light Tool. Discharge pending.

## 2019-10-30 NOTE — PLAN OF CARE
POD #1 C5-7 anterior decompression/fusion. A&O x4. Neuros intact with LUE fingers numbness/tingling but improving. VSS. Regular diet, thin liquids. Takes pills whole. Up with SBA with GB. Tylenol given for headache pain. Pt scoring green on the Aggression Stop Light Tool. Patient discharged home with spouse at 1130.

## 2020-03-05 ENCOUNTER — THERAPY VISIT (OUTPATIENT)
Dept: PHYSICAL THERAPY | Facility: CLINIC | Age: 50
End: 2020-03-05
Payer: COMMERCIAL

## 2020-03-05 DIAGNOSIS — Z98.1 S/P CERVICAL SPINAL FUSION: ICD-10-CM

## 2020-03-05 PROCEDURE — 97110 THERAPEUTIC EXERCISES: CPT | Mod: GP | Performed by: PHYSICAL THERAPIST

## 2020-03-05 PROCEDURE — 97162 PT EVAL MOD COMPLEX 30 MIN: CPT | Mod: GP | Performed by: PHYSICAL THERAPIST

## 2020-03-05 PROCEDURE — 97140 MANUAL THERAPY 1/> REGIONS: CPT | Mod: GP | Performed by: PHYSICAL THERAPIST

## 2020-03-05 NOTE — PROGRESS NOTES
[facility] Initial Evaluation  Subjective:  Patient is referred with c/o L medial scapular pain. She underwent C5-7 cervical fusion in October 2019 for acute L scapular and U/E sxs to the hand. Arm sxs improved significantly and she was doing well until about a month ago when she started noting L scapular pain again. Sxs are intermittent and aggravated by prolonged sitting and sometimes with L rotation.    The history is provided by the patient.   Therapist Generated HPI Evaluation         Type of problem:  Cervical spine.    This is a recurrent condition.  Condition occurred with:  Insidious onset.  Where condition occurred: for unknown reasons.  Patient reports pain:  Cervical left side (left medial scapula).  Pain is described as aching and is intermittent.  Pain radiates to:  Shoulder left. Pain is worse during the day.  Since onset symptoms are unchanged.  Associated symptoms:  Loss of motion/stiffness, numbness and tingling. Symptoms are exacerbated by certain positions, rotating head and sitting  and relieved by rest.    Previous treatment includes surgery. There was significant improvement following previous treatment.  Restrictions due to condition include:  Working in normal job without restrictions.  Barriers include:  None as reported by patient.    Patient Health History  Lucero Cedeno being seen for L scapular pain.          Pain is reported as 4/10 on pain scale.  General health as reported by patient is good.  Pertinent medical history includes: migraines/headaches, concussions/dizziness and numbness/tingling.   Red flags:  None as reported by patient.     Surgeries include:  Orthopedic surgery.        Current occupation is RN.   Primary job tasks include:  Lifting/carrying, computer work and prolonged standing.                                    Objective:  System    Physical Exam    [facility] Cervical Evaluation    Posture:  Sitting: good  Standing: good  Protruding Head: no  Wry  Neck: no  Correction of Posture: no effect  Other Observations: Decreased lordosis  Movement Loss:  Protrusion (PRO): nil  Flexion (Flex): min  Retraction (RET): nil and pain  Extension (EXT): major and pain  Lateral Flexion Right (LF R): mod  Lateral Flexion Left (LF L): mod  Rotation Right (ROT R): min  Rotation Left (ROT L): min and pain  Test Movements:  Pretest Pain Sitting: L scap  PRO: During: increases  After: no worse  Mechanical Response: no effect  Repeat PRO: During: increases  After: no worse  Mechanical Response: no effect  RET: During: increases  After: no worse  Mechanical Response: no effect  Repeat RET: During: increases  After: no worse  Mechanical Response: no effect                          Conclusion: other  Principle of Treatment:          Other: modalities, STM, MT                                         ROS    Assessment/Plan:    Patient is a 49 year old female with cervical complaints.    Patient has the following significant findings with corresponding treatment plan.                Diagnosis 1:  L scapular pain s/p cervical fusion  Pain -  hot/cold therapy, manual therapy, self management, education, directional preference exercise and home program  Decreased joint mobility - manual therapy, therapeutic exercise and home program  Impaired muscle performance - neuro re-education and home program  Decreased function - therapeutic activities and home program    Therapy Evaluation Codes:   1) History comprised of:   Personal factors that impact the plan of care:      Time since onset of symptoms and post surgical status.    Comorbidity factors that impact the plan of care are:      None.     Medications impacting care: None.  2) Examination of Body Systems comprised of:   Body structures and functions that impact the plan of care:      Cervical spine.   Activity limitations that impact the plan of care are:      Lifting, Reading/Computer work, Sitting and Sleeping.  3) Clinical presentation  characteristics are:   Evolving/Changing.  4) Decision-Making    Moderate complexity using standardized patient assessment instrument and/or measureable assessment of functional outcome.  Cumulative Therapy Evaluation is: Moderate complexity.    Previous and current functional limitations:  (See Goal Flow Sheet for this information)    Short term and Long term goals: (See Goal Flow Sheet for this information)     Communication ability:  Patient appears to be able to clearly communicate and understand verbal and written communication and follow directions correctly.  Treatment Explanation - The following has been discussed with the patient:   RX ordered/plan of care  Anticipated outcomes  Possible risks and side effects  This patient would benefit from PT intervention to resume normal activities.   Rehab potential is good.    Frequency:  2 X week, once daily  Duration:  for 3 weeks  Discharge Plan:  Achieve all LTG.  Independent in home treatment program.  Reach maximal therapeutic benefit.    Please refer to the daily flowsheet for treatment today, total treatment time and time spent performing 1:1 timed codes.

## 2020-03-05 NOTE — LETTER
[facility]  [address] [place] [address]  [address]  [facility] [address]  [phone]    2020    Re: Lucero Cedeno   :   1970  MRN:  [id number]   REFERRING PHYSICIAN:   Hong Cornell    [facility]    Date of Initial Evaluation:  2020  Visits:  Rxs Used: 1  Reason for Referral:  S/P cervical spinal fusion    EVALUATION SUMMARY    [facility] Initial Evaluation  Subjective:  Patient is referred with c/o L medial scapular pain. She underwent C5-7 cervical fusion in 2019 for acute L scapular and U/E sxs to the hand. Arm sxs improved significantly and she was doing well until about a month ago when she started noting L scapular pain again. Sxs are intermittent and aggravated by prolonged sitting and sometimes with L rotation.    The history is provided by the patient.   Therapist Generated HPI Evaluation         Type of problem:  Cervical spine.    This is a recurrent condition.  Condition occurred with:  Insidious onset.  Where condition occurred: for unknown reasons.  Patient reports pain:  Cervical left side (left medial scapula).  Pain is described as aching and is intermittent.  Pain radiates to:  Shoulder left. Pain is worse during the day.  Since onset symptoms are unchanged.  Associated symptoms:  Loss of motion/stiffness, numbness and tingling. Symptoms are exacerbated by certain positions, rotating head and sitting  and relieved by rest.    Previous treatment includes surgery. There was significant improvement following previous treatment.  Restrictions due to condition include:  Working in normal job without restrictions.  Barriers include:  None as reported by patient.    Patient Health History  Lucero Cedeno being seen for L scapular pain.   Pain is reported as 4/10 on pain scale.  General health as reported by patient is good.   Re: Lucero Cedeno    :   1970    Pertinent medical history includes: migraines/headaches, concussions/dizziness and numbness/tingling.   Red flags:  None as reported by patient.  Surgeries include:  Orthopedic surgery.    Current occupation is RN.   Primary job tasks include:  Lifting/carrying, computer work and prolonged standing.     Objective:  System    Physical Exam    Dorita Cervical Evaluation    Posture:  Sitting: good  Standing: good  Protruding Head: no  Wry Neck: no  Correction of Posture: no effect  Other Observations: Decreased lordosis  Movement Loss:  Protrusion (PRO): nil  Flexion (Flex): min  Retraction (RET): nil and pain  Extension (EXT): major and pain  Lateral Flexion Right (LF R): mod  Lateral Flexion Left (LF L): mod  Rotation Right (ROT R): min  Rotation Left (ROT L): min and pain  Test Movements:  Pretest Pain Sitting: L scap  PRO: During: increases  After: no worse  Mechanical Response: no effect  Repeat PRO: During: increases  After: no worse  Mechanical Response: no effect  RET: During: increases  After: no worse  Mechanical Response: no effect  Repeat RET: During: increases  After: no worse  Mechanical Response: no effect  Conclusion: other  Principle of Treatment:  Other: modalities, STM, MT      Assessment/Plan:    Patient is a 49 year old female with cervical complaints.    Patient has the following significant findings with corresponding treatment plan.                Diagnosis 1:  L scapular pain s/p cervical fusion  Pain -  hot/cold therapy, manual therapy, self management, education, directional preference exercise and home program  Decreased joint mobility - manual therapy, therapeutic exercise and home program  Impaired muscle performance - neuro re-education and home program  Decreased function - therapeutic activities and home program    Therapy Evaluation Codes:   1) History comprised of:  Re: Lucero Cedeno   :   1970     Personal factors that impact the plan of care:       Time since onset of symptoms and post surgical status.    Comorbidity factors that impact the plan of care are:      None.     Medications impacting care: None.  2) Examination of Body Systems comprised of:   Body structures and functions that impact the plan of care:      Cervical spine.   Activity limitations that impact the plan of care are:      Lifting, Reading/Computer work, Sitting and Sleeping.  3) Clinical presentation characteristics are:   Evolving/Changing.  4) Decision-Making    Moderate complexity using standardized patient assessment instrument and/or measureable assessment of functional outcome.  Cumulative Therapy Evaluation is: Moderate complexity.    Previous and current functional limitations:  (See Goal Flow Sheet for this information)    Short term and Long term goals: (See Goal Flow Sheet for this information)     Communication ability:  Patient appears to be able to clearly communicate and understand verbal and written communication and follow directions correctly.  Treatment Explanation - The following has been discussed with the patient:   RX ordered/plan of care  Anticipated outcomes  Possible risks and side effects  This patient would benefit from PT intervention to resume normal activities.   Rehab potential is good.    Frequency:  2 X week, once daily  Duration:  for 3 weeks  Discharge Plan:  Achieve all LTG.  Independent in home treatment program.  Reach maximal therapeutic benefit.    Thank you for your referral.    INQUIRIES  Therapist: Boaz Edmondson, PT, Cert. MDT  [facility]  [address] [place] [address]  [address]  [facility] [address]  Phone: [phone]  Fax: [phone]

## 2020-03-06 PROBLEM — Z98.1 S/P CERVICAL SPINAL FUSION: Status: ACTIVE | Noted: 2020-03-06

## 2020-03-12 ENCOUNTER — THERAPY VISIT (OUTPATIENT)
Dept: PHYSICAL THERAPY | Facility: CLINIC | Age: 50
End: 2020-03-12
Payer: COMMERCIAL

## 2020-03-12 DIAGNOSIS — Z98.1 S/P CERVICAL SPINAL FUSION: ICD-10-CM

## 2020-03-12 PROCEDURE — 97140 MANUAL THERAPY 1/> REGIONS: CPT | Mod: GP | Performed by: PHYSICAL THERAPIST

## 2020-03-12 PROCEDURE — 97110 THERAPEUTIC EXERCISES: CPT | Mod: GP | Performed by: PHYSICAL THERAPIST

## 2020-03-12 PROCEDURE — 97010 HOT OR COLD PACKS THERAPY: CPT | Mod: GP | Performed by: PHYSICAL THERAPIST

## 2020-03-19 ENCOUNTER — THERAPY VISIT (OUTPATIENT)
Dept: PHYSICAL THERAPY | Facility: CLINIC | Age: 50
End: 2020-03-19
Payer: COMMERCIAL

## 2020-03-19 DIAGNOSIS — Z98.1 S/P CERVICAL SPINAL FUSION: ICD-10-CM

## 2020-03-19 PROCEDURE — 97140 MANUAL THERAPY 1/> REGIONS: CPT | Mod: GP | Performed by: PHYSICAL THERAPIST

## 2020-03-19 PROCEDURE — 97530 THERAPEUTIC ACTIVITIES: CPT | Mod: GP | Performed by: PHYSICAL THERAPIST

## 2020-03-19 PROCEDURE — 97110 THERAPEUTIC EXERCISES: CPT | Mod: GP | Performed by: PHYSICAL THERAPIST

## 2020-07-02 ENCOUNTER — APPOINTMENT (OUTPATIENT)
Dept: FAMILY MEDICINE | Facility: CLINIC | Age: 50
End: 2020-07-02
Payer: COMMERCIAL

## 2020-11-12 ENCOUNTER — APPOINTMENT (OUTPATIENT)
Dept: GENERAL RADIOLOGY | Facility: CLINIC | Age: 50
End: 2020-11-12
Attending: EMERGENCY MEDICINE
Payer: COMMERCIAL

## 2020-11-12 ENCOUNTER — HOSPITAL ENCOUNTER (EMERGENCY)
Facility: CLINIC | Age: 50
Discharge: HOME | End: 2020-11-12
Attending: EMERGENCY MEDICINE | Admitting: EMERGENCY MEDICINE
Payer: COMMERCIAL

## 2020-11-12 VITALS
OXYGEN SATURATION: 98 % | DIASTOLIC BLOOD PRESSURE: 79 MMHG | BODY MASS INDEX: 24.34 KG/M2 | RESPIRATION RATE: 16 BRPM | WEIGHT: 170 LBS | TEMPERATURE: 98.7 F | SYSTOLIC BLOOD PRESSURE: 118 MMHG | HEART RATE: 76 BPM | HEIGHT: 70 IN
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
## 2020-11-12 PROCEDURE — 73070 X-RAY EXAM OF ELBOW: CPT | Mod: 26 | Performed by: RADIOLOGY

## 2020-11-12 PROCEDURE — 99283 EMERGENCY DEPT VISIT LOW MDM: CPT | Performed by: EMERGENCY MEDICINE

## 2020-11-12 PROCEDURE — 99284 EMERGENCY DEPT VISIT MOD MDM: CPT

## 2020-11-12 PROCEDURE — 72040 X-RAY EXAM NECK SPINE 2-3 VW: CPT | Mod: 26 | Performed by: RADIOLOGY

## 2020-11-12 PROCEDURE — 72040 X-RAY EXAM NECK SPINE 2-3 VW: CPT

## 2020-11-12 PROCEDURE — 73070 X-RAY EXAM OF ELBOW: CPT | Mod: LT

## 2020-11-12 ASSESSMENT — MIFFLIN-ST. JEOR: SCORE: 1481.36

## 2020-11-12 NOTE — ED AVS SNAPSHOT
[facility] Emergency Department  [address] [facility] [address]  Phone: [phone]                                    Lucero Cedeno   MRN: [id number]    Department: [facility] Emergency Department   Date of Visit: 11/12/2020           After Visit Summary Signature Page    I have received my discharge instructions, and my questions have been answered. I have discussed any challenges I see with this plan with the nurse or doctor.    ..........................................................................................................................................  Patient/Patient Representative Signature      ..........................................................................................................................................  Patient Representative Print Name and Relationship to Patient    ..................................................               ................................................  Date                                   Time    ..........................................................................................................................................  Reviewed by Signature/Title    ...................................................              ..............................................  Date                                               Time          22EPIC Rev 08/18

## 2020-11-12 NOTE — ED TRIAGE NOTES
Patient states she walking to car from work and fell back and hit her head. Denies LOC. She also hit her left elbow. Pt is concerned due to history of spinal surgery.

## 2020-11-13 NOTE — ED PROVIDER NOTES
ED Provider Note  [facility]      History     Chief Complaint   Patient presents with     Fall     The history is provided by the patient.     Lucero Cedeno is a 50 year old female with a medical history significant for cervical radiculopathy and s/p cervical spinal fusion (10/28/2019) who presents to the Emergency Department today after slipping and falling on a patch of ice. 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 She reports falling flat on her back while wearing a backpack and also striking her left elbow on the pavement. She reports pain in her left elbow. She denies any pain in her mid or lower back. She reports hitting her head, but denies any pain in her head. Patient is concerned for because she has had surgery on her c-spine with a plate and screws.  No other symptoms noted.     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      Hypertension     one time event with anesthsia when a child to remove wisdom teeth      Motion sickness      Osteoarthrosis     of neck     Premenstrual dysphoric disorder      Past Surgical History:   Procedure Laterality Date     DECOMPRESSION, FUSION CERVICAL ANTERIOR TWO LEVELS, COMBINED N/A 10/28/2019    Procedure: ANTERIOR CERVICAL DECOMPRESSION AND FUSION C5-6 AND C6-7, BONE MARROW ASPIRATION LEFT ILIAC CREST - [employer];  Surgeon: Hong Cornell MD;  Location: [facility] OR     ENT SURGERY      T+A     GYN SURGERY      EXPLORATORY LAPAROSCOPY     GYN SURGERY      endometrioma     HEAD & NECK SURGERY       ORTHOPEDIC SURGERY      knee arthroscopy          diazepam (VALIUM) 5 MG tablet       Omega-3 Fatty Acids (FISH OIL PO)       oxyCODONE-acetaminophen (PERCOCET) 5-325 MG tablet       PARoxetine (PAXIL) 10 MG tablet       rizatriptan  "(MAXALT-MLT) 10 MG ODT       vitamin D3 (CHOLECALCIFEROL) 2000 units (50 mcg) tablet      Allergies   Allergen Reactions     Bee Venom      Thimerosal      Family History  Family History   Family history unknown: Yes     Social History   Social History     Tobacco Use     Smoking status: Former Smoker     Packs/day: 0.00     Types: Cigarettes     Smokeless tobacco: Never Used     Tobacco comment: a few when in college    Substance Use Topics     Alcohol use: Yes     Comment: occasional      Drug use: No      Past medical history, past surgical history, medications, allergies, family history, and social history were reviewed with the patient. No additional pertinent items.       Review of Systems  A complete review of systems was performed with pertinent positives and negatives noted in the HPI, and all other systems negative.    Physical Exam   BP: 134/77  Pulse: 89  Temp: 97.5  F (36.4  C)  Resp: 18  Height: 177.8 cm (5' 10\")  Weight: 77.1 kg (170 lb)  SpO2: 98 %  Physical Exam  Constitutional:       General: She is not in acute distress.     Appearance: She is not diaphoretic.   HENT:      Head: Atraumatic.      Mouth/Throat:      Pharynx: No oropharyngeal exudate.   Eyes:      General: No scleral icterus.     Pupils: Pupils are equal, round, and reactive to light.   Neck:      Musculoskeletal: No spinous process tenderness.   Cardiovascular:      Pulses: Normal pulses.      Heart sounds: Normal heart sounds.      Comments: Equal bilateral radial pulses.  Cap refill less than 2 seconds in all digits.   Pulmonary:      Effort: No respiratory distress.      Breath sounds: Normal breath sounds.   Abdominal:      General: Bowel sounds are normal.      Palpations: Abdomen is soft.      Tenderness: There is no abdominal tenderness.   Musculoskeletal:         General: No swelling, tenderness or deformity.        Arms:       Comments: Full strength in left hand.   Skin:     General: Skin is warm.      Findings: No rash. "   Neurological:      Comments: No new neuro deficits.  Chronic paresthesias in left fingers unchanged.         ED Course      Procedures     8:22 PM  The patient was seen and examined by Colin Soares DO in Room ED09.                            No results found for any visits on 11/12/20.  Medications - No data to display     Assessments & Plan (with Medical Decision Making)   This is a 50-year-old female presents after a fall.  Patient was completing her shift at the hospital today when she slipped on the ice.  Patient fell on her back while wearing a backpack.  She struck her left elbow.  Patient is having pain in her left elbow.  She is also concerned as she has had previous surgery with plate and screws in her C-spine.  On exam she has tenderness to her left elbow.  She does have range of motion and is neurovascularly intact.  No other acute abnormalities.  X-ray of the elbow shows no acute abnormalities.  X-ray C-spine shows no acute abnormalities.  I discussed all results with patient.  Patient requested that the physician who did her neck surgery be made aware and able to see the images.  I will send a message to this physician so he is aware of this. Will discharge home with return precautions. Discussed reasons to return to the emergency department.  Patient understands and agrees with this plan.    I have reviewed the nursing notes. I have reviewed the findings, diagnosis, plan and need for follow up with the patient.    New Prescriptions    No medications on file       Final diagnoses:   None     Alejandro GARDINER, am serving as a trained medical scribe to document services personally performed by Colin Soares DO, based on the provider's statements to me.     I, Colin Soares DO, was physically present and have reviewed and verified the accuracy of this note documented by Alejandro Luna.    --  Colin Soares DO  [facility] EMERGENCY DEPARTMENT  11/12/2020     Colin Soares,  DO  11/14/20 0310

## 2021-03-12 PROBLEM — Z98.1 S/P CERVICAL SPINAL FUSION: Status: RESOLVED | Noted: 2020-03-06 | Resolved: 2021-03-12

## 2021-03-12 NOTE — PROGRESS NOTES
Discharge Note    Progress reporting period is from last progress note on   to Mar 19, 2020.    Lucero failed to follow up and current status is unknown.  Please see information below for last relevant information on current status.  Patient seen for 3 visits.    SUBJECTIVE  Subjective changes noted by patient:  Noting some improvement in L rotation. Still bothered by L medial scapular pain.  .  Current pain level is  .     Previous pain level was  4/10.   Changes in function:  Yes (See Goal flowsheet attached for changes in current functional level)  Adverse reaction to treatment or activity: None    OBJECTIVE  Changes noted in objective findings: Discussed use of theracane for TP massage at home. See flow. Reviewed how to do virtual visit     ASSESSMENT/PLAN  Diagnosis: s/p cervical fusion   Updated problem list and treatment plan:   Pain - HEP  STG/LTGs have been met or progress has been made towards goals:  Yes, please see goal flowsheet for most current information  Assessment of Progress: current status is unknown.    Last current status: Pt is progressing as expected   Self Management Plans:  HEP  I have re-evaluated this patient and find that the nature, scope, duration and intensity of the therapy is appropriate for the medical condition of the patient.  Lucero continues to require the following intervention to meet STG and LTG's:  HEP.    Recommendations:  Discharge with current home program.  Patient to follow up with MD as needed.    Please refer to the daily flowsheet for treatment today, total treatment time and time spent performing 1:1 timed codes.

## (undated) DEVICE — SPONGE SURGIFOAM 01GM POWDER 1978

## (undated) DEVICE — SU VICRYL 3-0 SH 27" J316H

## (undated) DEVICE — NDL BX BONE MARROW 11GA 4"

## (undated) DEVICE — SPONGE KITTNER 31001010

## (undated) DEVICE — ESU GROUND PAD UNIVERSAL W/O CORD

## (undated) DEVICE — DRAIN JACKSON PRATT RESERVOIR 100ML SU130-1305

## (undated) DEVICE — PREP SKIN SCRUB TRAY 4461A

## (undated) DEVICE — DRSG STERI STRIP 1/2X4" R1547

## (undated) DEVICE — SYR 20ML LL W/O NDL 302830

## (undated) DEVICE — DRAIN JACKSON PRATT 07FR ROUND SU130-1320

## (undated) DEVICE — GLOVE PROTEXIS POWDER FREE 9.0 ORTHOPEDIC 2D73ET90

## (undated) DEVICE — SU PROLENE 5-0 P-2 18" 8686G

## (undated) DEVICE — BUR ROUND 5MM CARBIDE XLONG 5093-230

## (undated) DEVICE — GLOVE PROTEXIS W/NEU-THERA 7.0  2D73TE70

## (undated) DEVICE — NDL SPINAL 18GA 3.5" 405184

## (undated) DEVICE — SOL WATER IRRIG 1000ML BOTTLE 2F7114

## (undated) DEVICE — DRAPE SHEET REV FOLD 3/4 9349

## (undated) DEVICE — GLOVE PROTEXIS BLUE W/NEU-THERA 7.5  2D73EB75

## (undated) DEVICE — NDL ANGIOCATH 14GA 2" 4088

## (undated) DEVICE — DRSG BANDAID 1X3" FABRIC CURITY LATEX FREE KC44101

## (undated) DEVICE — SU VICRYL 4-0 PS-2 18" UND J496H

## (undated) DEVICE — PIN DISTRACTION ANCHOR FOR SCR 14MM MDS9091414

## (undated) DEVICE — LINEN TOWEL PACK X5 5464

## (undated) DEVICE — SUCTION MANIFOLD NEPTUNE SGL

## (undated) DEVICE — IMM COLLAR CERVICAL MED UNIVERSAL 3X24" 79-83500

## (undated) DEVICE — PACK SPINE SM CUSTOM SNE15SSFSK

## (undated) RX ORDER — HYDROMORPHONE HYDROCHLORIDE 1 MG/ML
INJECTION, SOLUTION INTRAMUSCULAR; INTRAVENOUS; SUBCUTANEOUS
Status: DISPENSED
Start: 2019-10-28

## (undated) RX ORDER — SCOLOPAMINE TRANSDERMAL SYSTEM 1 MG/1
PATCH, EXTENDED RELEASE TRANSDERMAL
Status: DISPENSED
Start: 2019-10-28

## (undated) RX ORDER — GINSENG 100 MG
CAPSULE ORAL
Status: DISPENSED
Start: 2019-10-28

## (undated) RX ORDER — DIPHENHYDRAMINE HYDROCHLORIDE 50 MG/ML
INJECTION INTRAMUSCULAR; INTRAVENOUS
Status: DISPENSED
Start: 2019-10-28

## (undated) RX ORDER — CEFAZOLIN SODIUM 2 G/100ML
INJECTION, SOLUTION INTRAVENOUS
Status: DISPENSED
Start: 2019-10-28

## (undated) RX ORDER — ACETAMINOPHEN 325 MG/1
TABLET ORAL
Status: DISPENSED
Start: 2019-10-28

## (undated) RX ORDER — PROPOFOL 10 MG/ML
INJECTION, EMULSION INTRAVENOUS
Status: DISPENSED
Start: 2019-10-28

## (undated) RX ORDER — FENTANYL CITRATE 50 UG/ML
INJECTION, SOLUTION INTRAMUSCULAR; INTRAVENOUS
Status: DISPENSED
Start: 2019-10-28

## (undated) RX ORDER — GABAPENTIN 300 MG/1
CAPSULE ORAL
Status: DISPENSED
Start: 2019-10-28